# Patient Record
Sex: FEMALE | Race: WHITE | Employment: FULL TIME | URBAN - METROPOLITAN AREA
[De-identification: names, ages, dates, MRNs, and addresses within clinical notes are randomized per-mention and may not be internally consistent; named-entity substitution may affect disease eponyms.]

---

## 2018-02-27 ENCOUNTER — HOSPITAL ENCOUNTER (OUTPATIENT)
Dept: MAMMOGRAPHY | Age: 43
Discharge: HOME | End: 2018-02-27
Attending: INTERNAL MEDICINE | Admitting: INTERNAL MEDICINE

## 2018-02-27 DIAGNOSIS — Z12.31 VISIT FOR SCREENING MAMMOGRAM: ICD-10-CM

## 2018-06-21 ENCOUNTER — TELEPHONE (OUTPATIENT)
Dept: INTERNAL MEDICINE CLINIC | Age: 43
End: 2018-06-21

## 2018-06-26 ENCOUNTER — OFFICE VISIT (OUTPATIENT)
Dept: INTERNAL MEDICINE CLINIC | Age: 43
End: 2018-06-26

## 2018-06-26 VITALS
BODY MASS INDEX: 46.01 KG/M2 | HEART RATE: 64 BPM | SYSTOLIC BLOOD PRESSURE: 124 MMHG | RESPIRATION RATE: 12 BRPM | HEIGHT: 64 IN | WEIGHT: 269.5 LBS | DIASTOLIC BLOOD PRESSURE: 80 MMHG

## 2018-06-26 DIAGNOSIS — R20.2 RIGHT HAND PARESTHESIA: ICD-10-CM

## 2018-06-26 DIAGNOSIS — F41.9 ANXIETY: ICD-10-CM

## 2018-06-26 DIAGNOSIS — H53.483 TUNNEL VISUAL FIELD CONSTRICTION OF BOTH EYES: ICD-10-CM

## 2018-06-26 DIAGNOSIS — I10 ESSENTIAL HYPERTENSION: ICD-10-CM

## 2018-06-26 DIAGNOSIS — H81.12 BENIGN PAROXYSMAL POSITIONAL VERTIGO OF LEFT EAR: ICD-10-CM

## 2018-06-26 PROCEDURE — 99203 OFFICE O/P NEW LOW 30 MIN: CPT | Performed by: INTERNAL MEDICINE

## 2018-06-26 RX ORDER — HYDROCHLOROTHIAZIDE 25 MG/1
25 TABLET ORAL DAILY
COMMUNITY
End: 2019-02-05 | Stop reason: SDUPTHER

## 2018-06-26 RX ORDER — CITALOPRAM 20 MG/1
20 TABLET ORAL DAILY
COMMUNITY
End: 2018-06-26 | Stop reason: SDUPTHER

## 2018-06-26 RX ORDER — PROPRANOLOL HYDROCHLORIDE 80 MG/1
80 CAPSULE, EXTENDED RELEASE ORAL DAILY
Qty: 30 CAPSULE | Refills: 3 | Status: SHIPPED | OUTPATIENT
Start: 2018-06-26 | End: 2018-09-30 | Stop reason: SDUPTHER

## 2018-06-26 RX ORDER — MECLIZINE HYDROCHLORIDE CHEWABLE TABLETS 25 MG/1
25 TABLET, CHEWABLE ORAL 3 TIMES DAILY PRN
Qty: 30 TABLET | Refills: 0 | Status: SHIPPED | OUTPATIENT
Start: 2018-06-26 | End: 2020-09-18

## 2018-06-26 RX ORDER — CITALOPRAM 20 MG/1
20 TABLET ORAL DAILY
Qty: 30 TABLET | Refills: 3 | Status: SHIPPED | OUTPATIENT
Start: 2018-06-26 | End: 2018-08-07

## 2018-06-26 RX ORDER — METHYLDOPA 500 MG/1
500 TABLET, FILM COATED ORAL 2 TIMES DAILY
COMMUNITY
End: 2018-06-26 | Stop reason: ALTCHOICE

## 2018-06-26 RX ORDER — FLUTICASONE PROPIONATE 50 MCG
1 SPRAY, SUSPENSION (ML) NASAL DAILY
COMMUNITY

## 2018-06-26 ASSESSMENT — ENCOUNTER SYMPTOMS
CONSTIPATION: 0
SHORTNESS OF BREATH: 0
CHEST TIGHTNESS: 0
DIARRHEA: 0

## 2018-06-26 ASSESSMENT — PATIENT HEALTH QUESTIONNAIRE - PHQ9
1. LITTLE INTEREST OR PLEASURE IN DOING THINGS: 0
2. FEELING DOWN, DEPRESSED OR HOPELESS: 1
SUM OF ALL RESPONSES TO PHQ QUESTIONS 1-9: 1
SUM OF ALL RESPONSES TO PHQ9 QUESTIONS 1 & 2: 1

## 2018-07-02 ENCOUNTER — HOSPITAL ENCOUNTER (OUTPATIENT)
Dept: CT IMAGING | Age: 43
Discharge: HOME | End: 2018-07-02
Attending: INTERNAL MEDICINE | Admitting: INTERNAL MEDICINE

## 2018-07-02 DIAGNOSIS — H53.483 TUNNEL VISUAL FIELD CONSTRICTION OF BOTH EYES: ICD-10-CM

## 2018-07-02 DIAGNOSIS — R20.2 RIGHT HAND PARESTHESIA: ICD-10-CM

## 2018-07-02 DIAGNOSIS — R20.2 PARESTHESIA OF SKIN: ICD-10-CM

## 2018-08-07 ENCOUNTER — OFFICE VISIT (OUTPATIENT)
Dept: INTERNAL MEDICINE CLINIC | Age: 43
End: 2018-08-07

## 2018-08-07 VITALS
SYSTOLIC BLOOD PRESSURE: 122 MMHG | HEART RATE: 64 BPM | DIASTOLIC BLOOD PRESSURE: 78 MMHG | BODY MASS INDEX: 47.43 KG/M2 | WEIGHT: 272 LBS | RESPIRATION RATE: 12 BRPM

## 2018-08-07 DIAGNOSIS — I10 ESSENTIAL HYPERTENSION: Primary | ICD-10-CM

## 2018-08-07 DIAGNOSIS — R20.2 RIGHT HAND PARESTHESIA: ICD-10-CM

## 2018-08-07 DIAGNOSIS — F41.9 ANXIETY: ICD-10-CM

## 2018-08-07 PROCEDURE — 99213 OFFICE O/P EST LOW 20 MIN: CPT | Performed by: INTERNAL MEDICINE

## 2018-08-07 NOTE — PROGRESS NOTES
Patient: Muna Jurado is a 39 y.o. female who presents today with the following Chief Complaint(s):  Chief Complaint   Patient presents with    Follow-up     f/u on medication       Here for follow up on Anxiety, HTN, and finger tingling. Stopped citalopram once she found out that everything was ok with her fingers as her anxiety then calmed down. No more tingling with her fingers unless she uses them a lot. Is wearing her nocturnal wrist splints and that is helping. 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 Did feel tired initially with propranolol but is doing better now.              No Known Allergies   Past Medical History:   Diagnosis Date    Anxiety     HTN (hypertension)     Vertigo       Past Surgical History:   Procedure Laterality Date     SECTION  ,      Social History     Social History    Marital status:      Spouse name: N/A    Number of children: N/A    Years of education: N/A     Occupational History    teacher [address]     Social History Main Topics    Smoking status: Never Smoker    Smokeless tobacco: Never Used    Alcohol use Yes      Comment: rarely    Drug use: No    Sexual activity: Not on file     Other Topics Concern    Not on file     Social History Narrative    No narrative on file     Family History   Problem Relation Age of Onset    Psoriasis Mother     Arthritis Mother         psoriatic    Hypertension Father         Outpatient Medications Prior to Visit   Medication Sig Dispense Refill    hydrochlorothiazide (HYDRODIURIL) 25 MG tablet Take 25 mg by mouth daily      Cetirizine HCl (ZYRTEC ALLERGY PO) Take by mouth      fluticasone (FLONASE) 50 MCG/ACT nasal spray 1 spray by Nasal route daily      meclizine (ANTIVERT) 25 MG CHEW Take 1 tablet by mouth 3 times daily as needed (vertigo) 30 tablet 0    propranolol (INDERAL LA) 80 MG extended release capsule Take 1 capsule by mouth daily 30 capsule 3  citalopram (CELEXA) 20 MG tablet Take 1 tablet by mouth daily 30 tablet 3     No facility-administered medications prior to visit. Patient's past medical history, surgical history, family history, medications,  and allergies  were all reviewed and updated as appropriate today. Review of Systems   Constitutional: Negative for appetite change, fatigue and fever. Respiratory: Negative for chest tightness and shortness of breath. Cardiovascular: Negative for chest pain. Gastrointestinal: Negative for constipation and diarrhea. Skin: Negative for rash. /78 (Site: Left Arm)   Pulse 64   Resp 12   Wt 272 lb (123.4 kg)   LMP 07/31/2018 (Approximate)   Breastfeeding? No   BMI 47.43 kg/m²   Physical Exam   Constitutional: She appears well-developed and well-nourished. She is cooperative. Non-toxic appearance. HENT:   Head: Normocephalic. Right Ear: Tympanic membrane, external ear and ear canal normal.   Left Ear: Tympanic membrane, external ear and ear canal normal.   Nose: Nose normal.   Mouth/Throat: Oropharynx is clear and moist and mucous membranes are normal.   Neck: Carotid bruit is not present. No thyroid mass and no thyromegaly present. Cardiovascular: Normal rate, regular rhythm, normal heart sounds and intact distal pulses. No murmur heard. Pulses:       Dorsalis pedis pulses are 2+ on the right side, and 2+ on the left side. No LE edema   Pulmonary/Chest: Effort normal and breath sounds normal.   Musculoskeletal:        Right wrist: Normal. She exhibits normal range of motion, no tenderness, no swelling and no effusion. Left wrist: Normal. She exhibits normal range of motion, no tenderness and no swelling. Negative Tinnel's and negative Phalen's   Lymphadenopathy:     She has no cervical adenopathy. Neurological: She is alert.        ASSESSMENT/PLAN:    Problem List Items Addressed This Visit     Essential hypertension - Primary     Well controlled

## 2018-08-12 ASSESSMENT — ENCOUNTER SYMPTOMS
CHEST TIGHTNESS: 0
SHORTNESS OF BREATH: 0
DIARRHEA: 0
CONSTIPATION: 0

## 2018-08-12 NOTE — ASSESSMENT & PLAN NOTE
Resolved with negative work up for paresthesias. No longer taking citalopram. Will let me know if she needs to go back on it.

## 2018-09-30 DIAGNOSIS — H53.483 TUNNEL VISUAL FIELD CONSTRICTION OF BOTH EYES: ICD-10-CM

## 2018-09-30 DIAGNOSIS — I10 ESSENTIAL HYPERTENSION: ICD-10-CM

## 2018-10-01 RX ORDER — PROPRANOLOL HYDROCHLORIDE 80 MG/1
CAPSULE, EXTENDED RELEASE ORAL
Qty: 5 CAPSULE | Refills: 2 | Status: SHIPPED | OUTPATIENT
Start: 2018-10-01 | End: 2018-10-05 | Stop reason: SDUPTHER

## 2018-10-05 ENCOUNTER — TELEPHONE (OUTPATIENT)
Dept: INTERNAL MEDICINE CLINIC | Age: 43
End: 2018-10-05

## 2018-10-05 DIAGNOSIS — I10 ESSENTIAL HYPERTENSION: ICD-10-CM

## 2018-10-05 DIAGNOSIS — H53.483 TUNNEL VISUAL FIELD CONSTRICTION OF BOTH EYES: ICD-10-CM

## 2018-10-05 RX ORDER — PROPRANOLOL HYDROCHLORIDE 80 MG/1
80 CAPSULE, EXTENDED RELEASE ORAL DAILY
Qty: 90 CAPSULE | Refills: 3 | Status: SHIPPED | OUTPATIENT
Start: 2018-10-05 | End: 2018-10-05 | Stop reason: SDUPTHER

## 2018-10-05 RX ORDER — PROPRANOLOL HYDROCHLORIDE 80 MG/1
80 CAPSULE, EXTENDED RELEASE ORAL DAILY
Qty: 90 CAPSULE | Refills: 3 | Status: SHIPPED | OUTPATIENT
Start: 2018-10-05 | End: 2019-09-30

## 2018-10-05 NOTE — TELEPHONE ENCOUNTER
Pt call and states that [place] or [facility] pharmacy cannot fill her RX propranolol (INDERAL LA) 80 MG extended release capsule. She states her insurance required to [facility]. She [facility]  [facility]. She request call back if any question.

## 2018-10-05 NOTE — TELEPHONE ENCOUNTER
Pt calling needs refill of Propranolol ---insurance only pays for 90 day fills---please send to [facility]. Thanks.

## 2019-02-05 ENCOUNTER — OFFICE VISIT (OUTPATIENT)
Dept: INTERNAL MEDICINE CLINIC | Age: 44
End: 2019-02-05
Payer: COMMERCIAL

## 2019-02-05 VITALS
HEART RATE: 78 BPM | WEIGHT: 292.6 LBS | HEIGHT: 64 IN | DIASTOLIC BLOOD PRESSURE: 78 MMHG | BODY MASS INDEX: 49.95 KG/M2 | SYSTOLIC BLOOD PRESSURE: 118 MMHG

## 2019-02-05 DIAGNOSIS — I10 ESSENTIAL HYPERTENSION: Primary | ICD-10-CM

## 2019-02-05 DIAGNOSIS — H53.483 TUNNEL VISUAL FIELD CONSTRICTION OF BOTH EYES: ICD-10-CM

## 2019-02-05 DIAGNOSIS — F43.9 STRESS: ICD-10-CM

## 2019-02-05 PROCEDURE — 99214 OFFICE O/P EST MOD 30 MIN: CPT | Performed by: INTERNAL MEDICINE

## 2019-02-05 RX ORDER — HYDROCHLOROTHIAZIDE 25 MG/1
25 TABLET ORAL DAILY
Qty: 90 TABLET | Refills: 1 | Status: SHIPPED | OUTPATIENT
Start: 2019-02-05 | End: 2019-08-25 | Stop reason: SDUPTHER

## 2019-02-05 RX ORDER — BUPROPION HYDROCHLORIDE 150 MG/1
150 TABLET ORAL EVERY MORNING
Qty: 7 TABLET | Refills: 0 | Status: SHIPPED | OUTPATIENT
Start: 2019-02-05 | End: 2019-05-21 | Stop reason: ALTCHOICE

## 2019-02-05 RX ORDER — BUPROPION HYDROCHLORIDE 300 MG/1
300 TABLET ORAL EVERY MORNING
Qty: 30 TABLET | Refills: 3 | Status: SHIPPED | OUTPATIENT
Start: 2019-02-05 | End: 2019-05-21 | Stop reason: SDUPTHER

## 2019-02-05 ASSESSMENT — ENCOUNTER SYMPTOMS
CHEST TIGHTNESS: 0
SHORTNESS OF BREATH: 0
DIARRHEA: 0
CONSTIPATION: 0

## 2019-05-21 DIAGNOSIS — F43.9 STRESS: ICD-10-CM

## 2019-05-21 RX ORDER — BUPROPION HYDROCHLORIDE 300 MG/1
300 TABLET ORAL EVERY MORNING
Qty: 30 TABLET | Refills: 5 | Status: SHIPPED | OUTPATIENT
Start: 2019-05-21 | End: 2019-12-23 | Stop reason: ALTCHOICE

## 2019-07-17 ENCOUNTER — TELEPHONE (OUTPATIENT)
Dept: INTERNAL MEDICINE CLINIC | Age: 44
End: 2019-07-17

## 2019-07-17 NOTE — TELEPHONE ENCOUNTER
Patient returned call to reschedule appt. Cancelled appt for 8/5. Dr. Laury Glover only has same day slots available. Patient needs to try and get in before going back to school on 8/19.  Please contact patient

## 2019-08-15 ENCOUNTER — HOSPITAL ENCOUNTER (OUTPATIENT)
Dept: WOMENS IMAGING | Age: 44
Discharge: HOME | End: 2019-08-15
Payer: COMMERCIAL

## 2019-08-15 DIAGNOSIS — Z12.39 BREAST CANCER SCREENING: ICD-10-CM

## 2019-08-15 PROCEDURE — 77063 BREAST TOMOSYNTHESIS BI: CPT

## 2019-08-25 DIAGNOSIS — I10 ESSENTIAL HYPERTENSION: ICD-10-CM

## 2019-08-26 RX ORDER — HYDROCHLOROTHIAZIDE 25 MG/1
TABLET ORAL
Qty: 90 TABLET | Refills: 4 | Status: SHIPPED | OUTPATIENT
Start: 2019-08-26 | End: 2020-11-16

## 2019-09-29 DIAGNOSIS — H53.483 TUNNEL VISUAL FIELD CONSTRICTION OF BOTH EYES: ICD-10-CM

## 2019-09-29 DIAGNOSIS — I10 ESSENTIAL HYPERTENSION: ICD-10-CM

## 2019-09-30 RX ORDER — PROPRANOLOL HYDROCHLORIDE 80 MG/1
CAPSULE, EXTENDED RELEASE ORAL
Qty: 90 CAPSULE | Refills: 4 | Status: SHIPPED | OUTPATIENT
Start: 2019-09-30 | End: 2019-12-23 | Stop reason: ALTCHOICE

## 2019-12-23 ENCOUNTER — OFFICE VISIT (OUTPATIENT)
Dept: INTERNAL MEDICINE CLINIC | Age: 44
End: 2019-12-23
Payer: COMMERCIAL

## 2019-12-23 VITALS
OXYGEN SATURATION: 98 % | DIASTOLIC BLOOD PRESSURE: 80 MMHG | WEIGHT: 293 LBS | HEART RATE: 76 BPM | BODY MASS INDEX: 55.8 KG/M2 | SYSTOLIC BLOOD PRESSURE: 120 MMHG

## 2019-12-23 DIAGNOSIS — H53.483 TUNNEL VISUAL FIELD CONSTRICTION OF BOTH EYES: ICD-10-CM

## 2019-12-23 DIAGNOSIS — I10 ESSENTIAL HYPERTENSION: ICD-10-CM

## 2019-12-23 DIAGNOSIS — R53.83 OTHER FATIGUE: ICD-10-CM

## 2019-12-23 DIAGNOSIS — F41.9 ANXIETY: Primary | ICD-10-CM

## 2019-12-23 DIAGNOSIS — Z71.85 VACCINE COUNSELING: ICD-10-CM

## 2019-12-23 DIAGNOSIS — H00.011 HORDEOLUM EXTERNUM OF RIGHT UPPER EYELID: ICD-10-CM

## 2019-12-23 DIAGNOSIS — Z00.00 WELL ADULT EXAM: ICD-10-CM

## 2019-12-23 PROCEDURE — 99214 OFFICE O/P EST MOD 30 MIN: CPT | Performed by: INTERNAL MEDICINE

## 2019-12-23 RX ORDER — CITALOPRAM 10 MG/1
10 TABLET ORAL DAILY
Qty: 90 TABLET | Refills: 1 | Status: SHIPPED | OUTPATIENT
Start: 2019-12-23 | End: 2020-08-17 | Stop reason: SDUPTHER

## 2019-12-23 RX ORDER — BISOPROLOL FUMARATE 5 MG/1
5 TABLET ORAL DAILY
Qty: 30 TABLET | Refills: 3 | Status: SHIPPED | OUTPATIENT
Start: 2019-12-23 | End: 2020-09-18

## 2019-12-23 ASSESSMENT — PATIENT HEALTH QUESTIONNAIRE - PHQ9
SUM OF ALL RESPONSES TO PHQ QUESTIONS 1-9: 0
SUM OF ALL RESPONSES TO PHQ QUESTIONS 1-9: 0
SUM OF ALL RESPONSES TO PHQ9 QUESTIONS 1 & 2: 0
1. LITTLE INTEREST OR PLEASURE IN DOING THINGS: 0
2. FEELING DOWN, DEPRESSED OR HOPELESS: 0

## 2019-12-23 ASSESSMENT — ENCOUNTER SYMPTOMS
CHEST TIGHTNESS: 0
CONSTIPATION: 0
SHORTNESS OF BREATH: 0
DIARRHEA: 0

## 2019-12-26 PROBLEM — R53.83 OTHER FATIGUE: Status: ACTIVE | Noted: 2019-12-26

## 2019-12-26 PROBLEM — Z71.85 VACCINE COUNSELING: Status: ACTIVE | Noted: 2019-12-26

## 2019-12-26 PROBLEM — H00.011 HORDEOLUM EXTERNUM OF RIGHT UPPER EYELID: Status: ACTIVE | Noted: 2019-12-26

## 2020-01-23 DIAGNOSIS — Z00.00 WELL ADULT EXAM: ICD-10-CM

## 2020-01-23 DIAGNOSIS — R53.83 OTHER FATIGUE: ICD-10-CM

## 2020-01-23 LAB
A/G RATIO: 1.9 (ref 1.1–2.2)
ALBUMIN SERPL-MCNC: 4.4 G/DL (ref 3.4–5)
ALP BLD-CCNC: 63 U/L (ref 40–129)
ALT SERPL-CCNC: 11 U/L (ref 10–40)
ANION GAP SERPL CALCULATED.3IONS-SCNC: 15 MMOL/L (ref 3–16)
AST SERPL-CCNC: 15 U/L (ref 15–37)
BASOPHILS ABSOLUTE: 0.1 K/UL (ref 0–0.2)
BASOPHILS RELATIVE PERCENT: 1 %
BILIRUB SERPL-MCNC: 0.8 MG/DL (ref 0–1)
BUN BLDV-MCNC: 12 MG/DL (ref 7–20)
CALCIUM SERPL-MCNC: 9.5 MG/DL (ref 8.3–10.6)
CHLORIDE BLD-SCNC: 99 MMOL/L (ref 99–110)
CHOLESTEROL, TOTAL: 155 MG/DL (ref 0–199)
CO2: 24 MMOL/L (ref 21–32)
CREAT SERPL-MCNC: 0.7 MG/DL (ref 0.6–1.1)
EOSINOPHILS ABSOLUTE: 0 K/UL (ref 0–0.6)
EOSINOPHILS RELATIVE PERCENT: 0.9 %
GFR AFRICAN AMERICAN: >60
GFR NON-AFRICAN AMERICAN: >60
GLOBULIN: 2.3 G/DL
GLUCOSE BLD-MCNC: 87 MG/DL (ref 70–99)
HCT VFR BLD CALC: 37 % (ref 36–48)
HDLC SERPL-MCNC: 67 MG/DL (ref 40–60)
HEMOGLOBIN: 12 G/DL (ref 12–16)
LDL CHOLESTEROL CALCULATED: 75 MG/DL
LYMPHOCYTES ABSOLUTE: 1.3 K/UL (ref 1–5.1)
LYMPHOCYTES RELATIVE PERCENT: 23.7 %
MCH RBC QN AUTO: 25.2 PG (ref 26–34)
MCHC RBC AUTO-ENTMCNC: 32.3 G/DL (ref 31–36)
MCV RBC AUTO: 77.9 FL (ref 80–100)
MONOCYTES ABSOLUTE: 0.3 K/UL (ref 0–1.3)
MONOCYTES RELATIVE PERCENT: 6.3 %
NEUTROPHILS ABSOLUTE: 3.7 K/UL (ref 1.7–7.7)
NEUTROPHILS RELATIVE PERCENT: 68.1 %
PDW BLD-RTO: 16.6 % (ref 12.4–15.4)
PLATELET # BLD: 312 K/UL (ref 135–450)
PMV BLD AUTO: 8.5 FL (ref 5–10.5)
POTASSIUM SERPL-SCNC: 4.2 MMOL/L (ref 3.5–5.1)
RBC # BLD: 4.75 M/UL (ref 4–5.2)
SODIUM BLD-SCNC: 138 MMOL/L (ref 136–145)
TOTAL PROTEIN: 6.7 G/DL (ref 6.4–8.2)
TRIGL SERPL-MCNC: 66 MG/DL (ref 0–150)
TSH REFLEX: 2.5 UIU/ML (ref 0.27–4.2)
VITAMIN D 25-HYDROXY: 18.9 NG/ML
VLDLC SERPL CALC-MCNC: 13 MG/DL
WBC # BLD: 5.5 K/UL (ref 4–11)

## 2020-02-17 ENCOUNTER — OFFICE VISIT (OUTPATIENT)
Dept: INTERNAL MEDICINE CLINIC | Age: 45
End: 2020-02-17
Payer: COMMERCIAL

## 2020-02-17 VITALS
HEART RATE: 72 BPM | WEIGHT: 293 LBS | HEIGHT: 64 IN | BODY MASS INDEX: 50.02 KG/M2 | DIASTOLIC BLOOD PRESSURE: 78 MMHG | OXYGEN SATURATION: 99 % | SYSTOLIC BLOOD PRESSURE: 118 MMHG

## 2020-02-17 PROBLEM — E55.9 VITAMIN D INSUFFICIENCY: Status: ACTIVE | Noted: 2020-02-17

## 2020-02-17 PROBLEM — E66.01 CLASS 3 SEVERE OBESITY WITHOUT SERIOUS COMORBIDITY WITH BODY MASS INDEX (BMI) OF 50.0 TO 59.9 IN ADULT (HCC): Status: ACTIVE | Noted: 2020-02-17

## 2020-02-17 PROBLEM — E61.1 IRON DEFICIENCY: Status: ACTIVE | Noted: 2020-02-17

## 2020-02-17 PROBLEM — E66.813 CLASS 3 SEVERE OBESITY WITHOUT SERIOUS COMORBIDITY WITH BODY MASS INDEX (BMI) OF 50.0 TO 59.9 IN ADULT: Status: ACTIVE | Noted: 2020-02-17

## 2020-02-17 PROCEDURE — 99214 OFFICE O/P EST MOD 30 MIN: CPT | Performed by: INTERNAL MEDICINE

## 2020-02-17 RX ORDER — LISINOPRIL 10 MG/1
10 TABLET ORAL DAILY
Qty: 30 TABLET | Refills: 1 | Status: SHIPPED | OUTPATIENT
Start: 2020-02-17 | End: 2020-04-13

## 2020-02-17 ASSESSMENT — PATIENT HEALTH QUESTIONNAIRE - PHQ9
SUM OF ALL RESPONSES TO PHQ QUESTIONS 1-9: 0
SUM OF ALL RESPONSES TO PHQ9 QUESTIONS 1 & 2: 0
2. FEELING DOWN, DEPRESSED OR HOPELESS: 0
1. LITTLE INTEREST OR PLEASURE IN DOING THINGS: 0
SUM OF ALL RESPONSES TO PHQ QUESTIONS 1-9: 0

## 2020-02-17 ASSESSMENT — ENCOUNTER SYMPTOMS
DIARRHEA: 0
CONSTIPATION: 0
CHEST TIGHTNESS: 0
SHORTNESS OF BREATH: 0

## 2020-02-17 NOTE — PROGRESS NOTES
Patient: Hussain Mascorro is a 38 y.o. female who presents today with the following Chief Complaint(s):  Chief Complaint   Patient presents with    Blood Pressure Check     8 weeks       HPI     Here today for follow up. HTN- BP is well controlled on bisoprolol. Has not really noticed any changes in energy level. Has slightly improved sex drive. No tunnel vision. Does notice a slight HA when she first wakes up in the mornings. Is also worried that she has gained weight d/t beta blockers. Has been following keto diet very closely and has only lost 5 pounds. Is also taking Celexa q 4 days for the past few months. No increase in anxiety. Is worried that this has caused weight gain. Has had low iron in the past- periods were heavy but are getting lighter.      Lab Results   Component Value Date     01/23/2020    K 4.2 01/23/2020    CL 99 01/23/2020    CO2 24 01/23/2020    BUN 12 01/23/2020    CREATININE 0.7 01/23/2020    GLUCOSE 87 01/23/2020    CALCIUM 9.5 01/23/2020    PROT 6.7 01/23/2020    LABALBU 4.4 01/23/2020    BILITOT 0.8 01/23/2020    ALKPHOS 63 01/23/2020    AST 15 01/23/2020    ALT 11 01/23/2020    LABGLOM >60 01/23/2020    GFRAA >60 01/23/2020    AGRATIO 1.9 01/23/2020    GLOB 2.3 01/23/2020       Lab Results   Component Value Date    WBC 5.5 01/23/2020    HGB 12.0 01/23/2020    HCT 37.0 01/23/2020    MCV 77.9 (L) 01/23/2020     01/23/2020     Lab Results   Component Value Date    CHOL 155 01/23/2020     Lab Results   Component Value Date    TRIG 66 01/23/2020     Lab Results   Component Value Date    HDL 67 (H) 01/23/2020     Lab Results   Component Value Date    LDLCALC 75 01/23/2020     Lab Results   Component Value Date    LABVLDL 13 01/23/2020     No results found for: CHOLHDLRATIO    TSH 2.50  0.27 - 4.20 uIU/mL Final 01/23/2020 10:34 AM  - [facility]     Vit D, 25-Hydroxy 18.9Low   >=30 ng/mL Final 01/23/2020 10:34 AM  - [facility]         No Known Allergies She is alert. Psychiatric:         Mood and Affect: Mood normal.         Behavior: Behavior normal. Behavior is cooperative. ASSESSMENT/PLAN:    Problem List Items Addressed This Visit     Anxiety     Doing well weaning Celexa- is only taking every 4th day. Ok to stop Celexa all together. Class 3 severe obesity without serious comorbidity with body mass index (BMI) of 50.0 to 59.9 in [facility])     Is following keto-diet and has not loast as much weight as she would have expected. TSH was normal. Is worried that her weight gain is related to use of BB and citalopram as she started gaining weight when she started taking these medications. Will have her stop citalopram (has been weaning) and change bisoprolol to lisinopril. Essential hypertension - Primary     Discontinue bisoprolol (decrease to 2.5 mg qd x 1 week then stop) and start lisinopril 10 mg qd (5 mg qd x 1 week while weaning bisoprolol). Check BMP in 2 weeks. Relevant Medications    lisinopril (PRINIVIL;ZESTRIL) 10 MG tablet    Other Relevant Orders    BASIC METABOLIC PANEL    Iron deficiency     No anemia. Add OTC iron supplement qd. Tunnel visual field constriction of both eyes     Has resolved with use of BB. Will see if this recurs with discontinuing BB. Vitamin D insufficiency     Add OTC vitamin D3 5,000 units qd.                 Current Outpatient Medications   Medication Sig Dispense Refill    lisinopril (PRINIVIL;ZESTRIL) 10 MG tablet Take 1 tablet by mouth daily 30 tablet 1    citalopram (CELEXA) 10 MG tablet Take 1 tablet by mouth daily 90 tablet 1    bisoprolol (ZEBETA) 5 MG tablet Take 1 tablet by mouth daily 30 tablet 3    hydrochlorothiazide (HYDRODIURIL) 25 MG tablet TAKE 1 TABLET DAILY 90 tablet 4    Cetirizine HCl (ZYRTEC ALLERGY PO) Take by mouth      fluticasone (FLONASE) 50 MCG/ACT nasal spray 1 spray by Nasal route daily      meclizine (ANTIVERT) 25 MG CHEW Take 1 tablet by mouth 3 times daily as needed (vertigo) 30 tablet 0     No current facility-administered medications for this visit. Return in about 6 weeks (around 3/30/2020) for BP.

## 2020-02-17 NOTE — PATIENT INSTRUCTIONS
Please add an iron supplement and vitamin D3 5,000 units daily. Decrease bisoprolol to 1/2 pill daily for 1 week then stop  Add lisinopril 10 mg - take 1/2 pill daily while on bisoprolol then increase to 1 daily. I will want a non-fasting blood test 2 weeks after starting lisinopril to make sure that your body is tolerating it. Rarely, potassium levels increase and kidney function worsens with lisinopril. This is reversible with stopping lisinopril.      Stop citalopram.

## 2020-03-06 DIAGNOSIS — I10 ESSENTIAL HYPERTENSION: ICD-10-CM

## 2020-03-06 LAB
ANION GAP SERPL CALCULATED.3IONS-SCNC: 18 MMOL/L (ref 3–16)
BUN BLDV-MCNC: 17 MG/DL (ref 7–20)
CALCIUM SERPL-MCNC: 9.2 MG/DL (ref 8.3–10.6)
CHLORIDE BLD-SCNC: 99 MMOL/L (ref 99–110)
CO2: 22 MMOL/L (ref 21–32)
CREAT SERPL-MCNC: 0.7 MG/DL (ref 0.6–1.1)
GFR AFRICAN AMERICAN: >60
GFR NON-AFRICAN AMERICAN: >60
GLUCOSE BLD-MCNC: 83 MG/DL (ref 70–99)
POTASSIUM SERPL-SCNC: 3.7 MMOL/L (ref 3.5–5.1)
SODIUM BLD-SCNC: 139 MMOL/L (ref 136–145)

## 2020-03-24 RX ORDER — BUSPIRONE HYDROCHLORIDE 5 MG/1
5 TABLET ORAL 3 TIMES DAILY PRN
Qty: 30 TABLET | Refills: 0 | Status: SHIPPED | OUTPATIENT
Start: 2020-03-24 | End: 2020-08-17 | Stop reason: SDUPTHER

## 2020-03-30 ENCOUNTER — TELEPHONE (OUTPATIENT)
Dept: INTERNAL MEDICINE CLINIC | Age: 45
End: 2020-03-30

## 2020-04-13 RX ORDER — LISINOPRIL 10 MG/1
TABLET ORAL
Qty: 30 TABLET | Refills: 1 | Status: SHIPPED | OUTPATIENT
Start: 2020-04-13 | End: 2020-05-27 | Stop reason: SDUPTHER

## 2020-05-27 ENCOUNTER — TELEPHONE (OUTPATIENT)
Dept: INTERNAL MEDICINE CLINIC | Age: 45
End: 2020-05-27

## 2020-05-27 RX ORDER — LISINOPRIL 10 MG/1
TABLET ORAL
Qty: 90 TABLET | Refills: 3 | Status: SHIPPED | OUTPATIENT
Start: 2020-05-27 | End: 2021-05-19 | Stop reason: SDUPTHER

## 2020-08-17 RX ORDER — BUSPIRONE HYDROCHLORIDE 5 MG/1
5 TABLET ORAL 3 TIMES DAILY PRN
Qty: 90 TABLET | Refills: 3 | Status: SHIPPED | OUTPATIENT
Start: 2020-08-17 | End: 2020-08-21

## 2020-08-17 RX ORDER — CITALOPRAM 10 MG/1
10 TABLET ORAL DAILY
Qty: 90 TABLET | Refills: 1 | Status: SHIPPED | OUTPATIENT
Start: 2020-08-17 | End: 2020-09-04 | Stop reason: SDUPTHER

## 2020-08-19 ENCOUNTER — TELEPHONE (OUTPATIENT)
Dept: INTERNAL MEDICINE CLINIC | Age: 45
End: 2020-08-19

## 2020-08-19 NOTE — TELEPHONE ENCOUNTER
I have not seen any messages in [facility] regarding this. Is she referring to the Celexa or Buspar?

## 2020-08-19 NOTE — TELEPHONE ENCOUNTER
Pt called states she has been messaging thru  [employer] to let  Dr Ger Olivares know the medication prescribed her is making  tingling and numbness in her hands and when she looked it up on Google this is one of the side effects. She took the medication last pm but stopped this morning and she is questioning how long the medication will stay in her body until she starts filling better. This all started after her medication was increased  When speaking with the patient she was crying.

## 2020-08-19 NOTE — TELEPHONE ENCOUNTER
May take 1-2 days for symptoms to resolve. After she is feeling better, would be ok to take 5 mg instead of 10 mg as she did not have side effects with that dose.  saw

## 2020-08-19 NOTE — TELEPHONE ENCOUNTER
Pt states that she was having numbness with 5 mg also. She wants to make sure that the Citalopram is okay for her to take still. She would like to know if she can something as needed added to what she is already taking. 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

## 2020-08-21 ENCOUNTER — OFFICE VISIT (OUTPATIENT)
Dept: INTERNAL MEDICINE CLINIC | Age: 45
End: 2020-08-21
Payer: COMMERCIAL

## 2020-08-21 VITALS
BODY MASS INDEX: 52.42 KG/M2 | TEMPERATURE: 98.2 F | WEIGHT: 293 LBS | HEART RATE: 112 BPM | SYSTOLIC BLOOD PRESSURE: 128 MMHG | DIASTOLIC BLOOD PRESSURE: 80 MMHG | OXYGEN SATURATION: 99 %

## 2020-08-21 PROCEDURE — 99214 OFFICE O/P EST MOD 30 MIN: CPT | Performed by: NURSE PRACTITIONER

## 2020-08-21 RX ORDER — HYDROXYZINE HYDROCHLORIDE 25 MG/1
50 TABLET, FILM COATED ORAL EVERY 8 HOURS PRN
Qty: 30 TABLET | Refills: 0 | Status: SHIPPED | OUTPATIENT
Start: 2020-08-21 | End: 2020-09-18

## 2020-08-21 ASSESSMENT — ENCOUNTER SYMPTOMS
PHOTOPHOBIA: 0
SHORTNESS OF BREATH: 0
CHEST TIGHTNESS: 0
COUGH: 0
WHEEZING: 0
EYE PAIN: 0

## 2020-08-21 NOTE — PROGRESS NOTES
8/21/20     Chief Complaint   Patient presents with    Numbness     right hand/ fingers, face, started buspar thought it was her anxiety, higher dose and now everything has gotten worse     HPI    Patient is here for numbness /tingling in her right hands /fingers which started about 1 week after starting buspar. Has increased anxiety started on buspar - thought it was anxiety. Increased buspar to 10 mg TID - numbness and tingling got worse - spread to bilateral feet, left hand and face. Stopped the medication weds and the buspar with some improvement to numbness and tingling. Now only to right finger tips and cheek. She denies any chest pain, shortness of breath, dizziness, weakness  She reports she is sleeping well at night. Pt is a    Going back Sept 3rd with [employer] [employer]. She is extremely anxious about going back to school. No Known Allergies    Current Outpatient Medications   Medication Sig Dispense Refill    hydrOXYzine (ATARAX) 25 MG tablet Take 2 tablets by mouth every 8 hours as needed for Anxiety 30 tablet 0    citalopram (CELEXA) 10 MG tablet Take 1 tablet by mouth daily 90 tablet 1    lisinopril (PRINIVIL;ZESTRIL) 10 MG tablet TAKE 1 TABLET BY MOUTH EVERY DAY 90 tablet 3    hydrochlorothiazide (HYDRODIURIL) 25 MG tablet TAKE 1 TABLET DAILY 90 tablet 4    Cetirizine HCl (ZYRTEC ALLERGY PO) Take by mouth      bisoprolol (ZEBETA) 5 MG tablet Take 1 tablet by mouth daily (Patient not taking: Reported on 8/21/2020) 30 tablet 3    fluticasone (FLONASE) 50 MCG/ACT nasal spray 1 spray by Nasal route daily      meclizine (ANTIVERT) 25 MG CHEW Take 1 tablet by mouth 3 times daily as needed (vertigo) (Patient not taking: Reported on 8/21/2020) 30 tablet 0     No current facility-administered medications for this visit. Review of Systems   Constitutional: Negative for chills, fatigue and fever. Eyes: Negative for photophobia, pain and visual disturbance.

## 2020-08-25 ENCOUNTER — HOSPITAL ENCOUNTER (OUTPATIENT)
Dept: MAMMOGRAPHY | Age: 45
Discharge: HOME | End: 2020-08-25
Payer: COMMERCIAL

## 2020-08-25 PROCEDURE — 77063 BREAST TOMOSYNTHESIS BI: CPT

## 2020-09-04 ENCOUNTER — TELEPHONE (OUTPATIENT)
Dept: INTERNAL MEDICINE CLINIC | Age: 45
End: 2020-09-04

## 2020-09-04 RX ORDER — CITALOPRAM 20 MG/1
20 TABLET ORAL DAILY
Qty: 30 TABLET | Refills: 5 | Status: SHIPPED | OUTPATIENT
Start: 2020-09-04 | End: 2020-09-18 | Stop reason: SDUPTHER

## 2020-09-04 NOTE — TELEPHONE ENCOUNTER
Pt calling needs new script for the Citalopram---she is now taking 20 mg instead of 10---Anita Tamez had upped her dose but she now needs a new script---she only has enough for today and tomorrow--please send to [facility] [place]  Thanks.

## 2020-09-18 ENCOUNTER — OFFICE VISIT (OUTPATIENT)
Dept: INTERNAL MEDICINE CLINIC | Age: 45
End: 2020-09-18
Payer: COMMERCIAL

## 2020-09-18 VITALS
SYSTOLIC BLOOD PRESSURE: 120 MMHG | HEART RATE: 84 BPM | OXYGEN SATURATION: 99 % | WEIGHT: 293 LBS | BODY MASS INDEX: 52.59 KG/M2 | TEMPERATURE: 98.3 F | DIASTOLIC BLOOD PRESSURE: 78 MMHG

## 2020-09-18 PROCEDURE — 99213 OFFICE O/P EST LOW 20 MIN: CPT | Performed by: NURSE PRACTITIONER

## 2020-09-18 RX ORDER — CITALOPRAM 20 MG/1
20 TABLET ORAL DAILY
Qty: 90 TABLET | Refills: 1 | Status: SHIPPED | OUTPATIENT
Start: 2020-09-18 | End: 2021-03-29

## 2020-09-18 ASSESSMENT — ENCOUNTER SYMPTOMS
CHEST TIGHTNESS: 0
WHEEZING: 0
SHORTNESS OF BREATH: 0
COUGH: 0

## 2020-09-18 NOTE — PROGRESS NOTES
2020    This is a 54 y.o. female   Chief Complaint   Patient presents with    1 Month Follow-Up     anxiety from reaction to medication, is feeling normal again     HPI     - @ Jocelynn Santiago in the [place]. Patient here for four week follow up after increasing Celexa. Overall feeling well, mood has been doing well. She feels \"wonderful\". No anxious thoughts or feelings, no SI/HI. BP well controlled on HCTZ and lisinopril (120/78 today), taking medications as prescribed with no side effects.      Past Medical History:   Diagnosis Date    Anxiety     HTN (hypertension)     Vertigo      Past Surgical History:   Procedure Laterality Date     SECTION  ,     Social History     Socioeconomic History    Marital status:      Spouse name: Not on file    Number of children: Not on file    Years of education: Not on file    Highest education level: Not on file   Occupational History    Occupation: teacher     Employer: [employer]   Social Needs    Financial resource strain: Not on file    Food insecurity     Worry: Not on file     Inability: Not on file    Transportation needs     Medical: Not on file     Non-medical: Not on file   Tobacco Use    Smoking status: Never Smoker    Smokeless tobacco: Never Used   Substance and Sexual Activity    Alcohol use: Yes     Comment: rarely    Drug use: No    Sexual activity: Not on file   Lifestyle    Physical activity     Days per week: Not on file     Minutes per session: Not on file    Stress: Not on file   Relationships    Social connections     Talks on phone: Not on file     Gets together: Not on file     Attends [employer] service: Not on file     Active member of club or organization: Not on file     Attends meetings of clubs or organizations: Not on file     Relationship status: Not on file    Intimate partner violence     Fear of current or ex partner: Not on file     Emotionally abused: Not on file     Physically abused: Not on file     Forced sexual activity: Not on file   Other Topics Concern    Not on file   Social History Narrative    Not on file       Family History   Problem Relation Age of Onset    Psoriasis Mother     Arthritis Mother         psoriatic    Hypertension Father        Current Outpatient Medications   Medication Sig Dispense Refill    citalopram (CELEXA) 20 MG tablet Take 1 tablet by mouth daily 90 tablet 1    lisinopril (PRINIVIL;ZESTRIL) 10 MG tablet TAKE 1 TABLET BY MOUTH EVERY DAY 90 tablet 3    hydrochlorothiazide (HYDRODIURIL) 25 MG tablet TAKE 1 TABLET DAILY 90 tablet 4    Cetirizine HCl (ZYRTEC ALLERGY PO) Take by mouth      fluticasone (FLONASE) 50 MCG/ACT nasal spray 1 spray by Nasal route daily       No current facility-administered medications for this visit. Allergies   Allergen Reactions    Buspar [Buspirone] Other (See Comments)     Numbness and tingling        Orders Only on 03/06/2020   Component Date Value Ref Range Status    Sodium 03/06/2020 139  136 - 145 mmol/L Final    Potassium 03/06/2020 3.7  3.5 - 5.1 mmol/L Final    Chloride 03/06/2020 99  99 - 110 mmol/L Final    CO2 03/06/2020 22  21 - 32 mmol/L Final    Anion Gap 03/06/2020 18* 3 - 16 Final    Glucose 03/06/2020 83  70 - 99 mg/dL Final    BUN 03/06/2020 17  7 - 20 mg/dL Final    CREATININE 03/06/2020 0.7  0.6 - 1.1 mg/dL Final    GFR Non- 03/06/2020 >60  >60 Final    Comment: >60 mL/min/1.73m2 EGFR, calc. for ages 25 and older using the  MDRD formula (not corrected for weight), is valid for stable  renal function.  GFR  03/06/2020 >60  >60 Final    Comment: Chronic Kidney Disease: less than 60 ml/min/1.73 sq.m. Kidney Failure: less than 15 ml/min/1.73 sq.m. Results valid for patients 18 years and older.       Calcium 03/06/2020 9.2  8.3 - 10.6 mg/dL Final       Review of Systems   Constitutional: Negative for chills, fatigue and fever.   Respiratory: Negative for cough, chest tightness, shortness of breath and wheezing. Cardiovascular: Negative for chest pain, palpitations and leg swelling. Neurological: Negative for dizziness, tremors, light-headedness and headaches. /78   Pulse 84   Temp 98.3 °F (36.8 °C)   Wt (!) 306 lb 6.4 oz (139 kg)   SpO2 99%   BMI 52.59 kg/m²   Physical Exam  Vitals signs reviewed. Constitutional:       General: She is not in acute distress. Appearance: She is well-developed. She is not ill-appearing or diaphoretic. HENT:      Head: Normocephalic and atraumatic. Cardiovascular:      Rate and Rhythm: Normal rate and regular rhythm. Heart sounds: Normal heart sounds. No murmur. Pulmonary:      Effort: Pulmonary effort is normal. No respiratory distress. Breath sounds: Normal breath sounds. No wheezing or rhonchi. Abdominal:      General: Bowel sounds are normal. There is no distension. Palpations: Abdomen is soft. There is no mass. Tenderness: There is no abdominal tenderness. Skin:     General: Skin is warm and dry. Neurological:      General: No focal deficit present. Mental Status: She is alert and oriented to person, place, and time. Psychiatric:         Mood and Affect: Mood and affect normal.         Behavior: Behavior normal.         Thought Content: Thought content normal.       Diagnosis  Assessment and Plan  1. Anxiety  Stable, controlled on current regimen. 2. Essential hypertension  Stable, controlled on current regimen. Patient to follow up in 6 months or sooner if symptoms persist or worsen.     Electronically signed by JARED Kessler CNP on 9/18/2020 at 4:22 PM

## 2020-11-16 RX ORDER — HYDROCHLOROTHIAZIDE 25 MG/1
TABLET ORAL
Qty: 90 TABLET | Refills: 3 | Status: SHIPPED | OUTPATIENT
Start: 2020-11-16 | End: 2021-11-22

## 2020-12-16 RX ORDER — AMOXICILLIN AND CLAVULANATE POTASSIUM 875; 125 MG/1; MG/1
1 TABLET, FILM COATED ORAL 2 TIMES DAILY
Qty: 20 TABLET | Refills: 0 | Status: SHIPPED | OUTPATIENT
Start: 2020-12-16 | End: 2020-12-26

## 2020-12-29 ENCOUNTER — PATIENT MESSAGE (OUTPATIENT)
Dept: INTERNAL MEDICINE CLINIC | Age: 45
End: 2020-12-29

## 2020-12-30 RX ORDER — METHYLPREDNISOLONE 4 MG/1
TABLET ORAL
Qty: 21 TABLET | Refills: 0 | Status: SHIPPED | OUTPATIENT
Start: 2020-12-30 | End: 2021-01-08

## 2020-12-30 NOTE — TELEPHONE ENCOUNTER
From: Anya Miller  To: Gus Owens DO  Sent: 12/29/2020 7:43 PM EST  Subject: Non-Urgent Moe Reza,  I hope you had a great Oli. The antibiotics cleared up my sinus infection, so thank you for calling that in for me. Now, my sciatic nerve is irritated and it is shooting down my leg. It will flare up once in a while, but this time it is hard to just walk. Ive tried icing and heat, stretches and Advil. None of it is helping. Is there anything you can give me to help. Maybe muscle relaxer or prednisone? I am falling apart this year. Thanks so much!   Alex Heredia

## 2021-01-08 ENCOUNTER — OFFICE VISIT (OUTPATIENT)
Dept: INTERNAL MEDICINE CLINIC | Age: 46
End: 2021-01-08
Payer: COMMERCIAL

## 2021-01-08 VITALS
SYSTOLIC BLOOD PRESSURE: 124 MMHG | OXYGEN SATURATION: 99 % | BODY MASS INDEX: 51.49 KG/M2 | DIASTOLIC BLOOD PRESSURE: 80 MMHG | TEMPERATURE: 96.7 F | HEART RATE: 98 BPM | WEIGHT: 293 LBS

## 2021-01-08 DIAGNOSIS — M25.571 ACUTE RIGHT ANKLE PAIN: Primary | ICD-10-CM

## 2021-01-08 PROCEDURE — 99213 OFFICE O/P EST LOW 20 MIN: CPT | Performed by: NURSE PRACTITIONER

## 2021-01-08 RX ORDER — NAPROXEN 500 MG/1
500 TABLET ORAL 2 TIMES DAILY PRN
Qty: 60 TABLET | Refills: 0 | Status: SHIPPED | OUTPATIENT
Start: 2021-01-08 | End: 2021-11-01

## 2021-01-08 ASSESSMENT — PATIENT HEALTH QUESTIONNAIRE - PHQ9
2. FEELING DOWN, DEPRESSED OR HOPELESS: 0
SUM OF ALL RESPONSES TO PHQ QUESTIONS 1-9: 0

## 2021-01-08 ASSESSMENT — ENCOUNTER SYMPTOMS
NAUSEA: 0
COUGH: 0
CONSTIPATION: 0
WHEEZING: 0
DIARRHEA: 0
VOMITING: 0
ABDOMINAL PAIN: 0
SHORTNESS OF BREATH: 0

## 2021-01-08 NOTE — PROGRESS NOTES
Acute Office Visit  1/8/2021    SUBJECTIVE:    Patient ID: Cristy Salazar is a 54 y.o. female. Chief Complaint   Patient presents with    Ankle Pain     right foot and ankle     HPI: The patient presents to the office for an acute visit. Pt states that 1.5 weeks ago, she was started on steroids d/t right sided sciatica from a provider in the office. She states that this helped her sciatic pain. Last week, she started with right sided toe/ankle pains. She denies injury/trauma. Toe pains resolved. Her  states that she was walking on her toes d/t sciatic pains- this is when the pain started. Right lateral ankle swollen. Using crutches to help decrease pain. Foot pain improving from when this started- improving daily per pt. Using advil OTC and epsum salt baths with some relief. Denies chest pain, palpitations, shortness of breath, trouble breathing, lightheadedness, dizziness or blurred vision. Allergies   Allergen Reactions    Buspar [Buspirone] Other (See Comments)     Numbness and tingling      Current Outpatient Medications   Medication Sig Dispense Refill    naproxen (NAPROSYN) 500 MG tablet Take 1 tablet by mouth 2 times daily as needed for Pain 60 tablet 0    hydroCHLOROthiazide (HYDRODIURIL) 25 MG tablet TAKE 1 TABLET DAILY 90 tablet 3    citalopram (CELEXA) 20 MG tablet Take 1 tablet by mouth daily 90 tablet 1    lisinopril (PRINIVIL;ZESTRIL) 10 MG tablet TAKE 1 TABLET BY MOUTH EVERY DAY 90 tablet 3    Cetirizine HCl (ZYRTEC ALLERGY PO) Take by mouth      fluticasone (FLONASE) 50 MCG/ACT nasal spray 1 spray by Nasal route daily       No current facility-administered medications for this visit. Review of Systems   Constitutional: Negative for chills, fatigue, fever and unexpected weight change. Eyes: Negative for visual disturbance. Respiratory: Negative for cough, shortness of breath and wheezing. Cardiovascular: Negative for chest pain, palpitations and leg swelling. Gastrointestinal: Negative for abdominal pain, constipation, diarrhea, nausea and vomiting. Musculoskeletal:        Right ankle pain   Skin: Negative for pallor and rash. Neurological: Negative for dizziness, weakness, light-headedness, numbness and headaches. OBJECTIVE:  /80   Pulse 98   Temp 96.7 °F (35.9 °C)   Wt 300 lb (136.1 kg)   SpO2 99%   BMI 51.49 kg/m²    Physical Exam  Vitals signs reviewed. Constitutional:       General: She is not in acute distress. Appearance: She is well-developed. She is not diaphoretic. HENT:      Head: Normocephalic and atraumatic. Eyes:      Pupils: Pupils are equal, round, and reactive to light. Cardiovascular:      Rate and Rhythm: Normal rate and regular rhythm. Pulmonary:      Effort: Pulmonary effort is normal.      Breath sounds: Normal breath sounds. Musculoskeletal:      Comments: +1 non-pitting edema noted to the right lateral ankle   Skin:     General: Skin is warm and dry. Neurological:      Mental Status: She is alert and oriented to person, place, and time. Coordination: Coordination normal.   Psychiatric:         Mood and Affect: Mood normal.       ASSESSMENT/PLAN:  Cora was seen today for ankle pain. Diagnoses and all orders for this visit:    Acute right ankle pain  -    Pain started after the patient was treated for sciatica and she was walking on the tips of her toes-this is when right lateral ankle pain and swelling started. - ROM normal. No redness/heat or tenderness to palpation.  - Symptomatic management reviewed. Recommended heating pads and elevation of lower extremities with anti-inflammatories. Patient is agreeable to the plan. - naproxen (NAPROSYN) 500 MG tablet; Take 1 tablet by mouth 2 times daily as needed for Pain - patient education handout provided and reviewed with the pt. - Patient will call symptoms worsen or fail to improve  - Red flag warning signs reviewed with patient she will go to the ER if these occur    Return for as previously scheduled or sooner if needed. Pt informed to call if symptoms worsen or fail to improve. All questions answered. Patient states no further questions or concerns at this time.     Electronically signed by JARED Navarro CNP 01/08/21

## 2021-01-08 NOTE — PATIENT INSTRUCTIONS
Heating pad or ice twice daily  Naproxen twice daily for 7 days and then as needed  Elevated legs    Patient Education   naproxen  Pronunciation:  na PROX en  Brand:  Aleve, Anaprox-DS, Midol Extended Relief, Naprelan 500, Naprosyn  What is the most important information I should know about naproxen? Naproxen can increase your risk of fatal heart attack or stroke, even if you don't have any risk factors. Do not use this medicine just before or after heart bypass surgery (coronary artery bypass graft, or CABG). Naproxen may also cause stomach or intestinal bleeding, which can be fatal. These conditions can occur without warning while you are using naproxen, especially in older adults. What is naproxen? Naproxen is a nonsteroidal anti-inflammatory drug (NSAID). Naproxen is used to treat pain or inflammation caused by conditions such as arthritis, ankylosing spondylitis, tendinitis, bursitis, gout, or menstrual cramps. The delayed-release or extended-release tablets are slower-acting forms of naproxen that are used only for treating chronic conditions such as arthritis or ankylosing spondylitis. These forms of naproxen will not work fast enough to treat acute pain. Naproxen may also be used for purposes not listed in this medication guide. What should I discuss with my healthcare provider before taking naproxen? Naproxen can increase your risk of fatal heart attack or stroke, even if you don't have any risk factors. Do not use this medicine just before or after heart bypass surgery (coronary artery bypass graft, or CABG). Naproxen may also cause stomach or intestinal bleeding, which can be fatal. These conditions can occur without warning while you are using naproxen, especially in older adults. You should not use naproxen if you are allergic to it, or if you have ever had an asthma attack or severe allergic reaction after taking aspirin or an NSAID. Ask a doctor before giving naproxen to a child younger than 15years old. Ask a doctor or pharmacist if this medicine is safe to use if you have:  · heart disease, high blood pressure, high cholesterol, diabetes, or if you smoke;  · a heart attack, stroke, or blood clot;  · stomach ulcers or bleeding;  · asthma;  · liver or kidney disease;  · fluid retention; or  · if you take aspirin to prevent heart attack or stroke. Taking naproxen during the last 3 months of pregnancy may harm the unborn baby. Ask a doctor before using this medicine if you are pregnant. Naproxen may interfere with ovulation, causing temporary infertility. You should not breastfeed while using this medicine. How should I take naproxen? Use exactly as directed on the label, or as prescribed by your doctor. Use the lowest dose that is effective in treating your condition. Shake the oral suspension (liquid) before you measure a dose. Use the dosing syringe provided, or use a medicine dose-measuring device (not a kitchen spoon). Always follow directions on the medicine label about giving this medicine to a child. Naproxen doses are based on weight in children. Your child's dose needs may change if the child gains or loses weight. If you use naproxen long-term, you may need frequent medical tests. This medicine can affect the results of certain medical tests. Tell any doctor who treats you that you are using naproxen. Store at room temperature away from moisture, heat, and light. Keep the bottle tightly closed when not in use. What happens if I miss a dose? Since naproxen is used when needed, you may not be on a dosing schedule. Skip any missed dose if it's almost time for your next dose. Do not use two doses at one time. What happens if I overdose? Seek emergency medical attention or call the Poison Help line at 1-412.151.5106. What should I avoid while taking naproxen? Avoid drinking alcohol. It may increase your risk of stomach bleeding. Avoid taking aspirin unless your doctor tells you to. Ask a doctor or pharmacist before using other medicines for pain, fever, swelling, or cold/flu symptoms. They may contain ingredients similar to naproxen (such as aspirin, ibuprofen, or ketoprofen). Ask your doctor before using an antacid, and use only the type your doctor recommends. Some antacids can make it harder for your body to absorb naproxen. What are the possible side effects of naproxen? Get emergency medical help if you have signs of an allergic reaction (runny or stuffy nose, wheezing or trouble breathing, hives, swelling in your face or throat) or a severe skin reaction (fever, sore throat, burning eyes, skin pain, red or purple skin rash with blistering and peeling). Get emergency medical help if you have signs of a heart attack or stroke: chest pain spreading to your jaw or shoulder, sudden numbness or weakness on one side of the body, slurred speech, leg swelling, feeling short of breath. Stop using naproxen and call your doctor at once if you have:  · shortness of breath (even with mild exertion); · swelling or rapid weight gain;  · the first sign of any skin rash or blister, no matter how mild;  · signs of stomach bleeding --bloody or tarry stools, coughing up blood or vomit that looks like coffee grounds;  · liver problems --nausea, upper stomach pain, loss of appetite, dark urine, kori-colored stools, jaundice (yellowing of the skin or eyes);  · kidney problems --little or no urination, painful urination, swelling in your feet or ankles; or  · low red blood cells (anemia) --pale skin, unusual tiredness, feeling light-headed or short of breath, cold hands and feet.   Common side effects may include:  · headache;  · indigestion, heartburn, stomach pain; or  · flu symptoms; This is not a complete list of side effects and others may occur. Call your doctor for medical advice about side effects. You may report side effects to FDA at 7-968-KML-1205. What other drugs will affect naproxen? Ask your doctor before using naproxen if you take an antidepressant. Taking certain antidepressants with an NSAID may cause you to bruise or bleed easily. Ask a doctor or pharmacist before using naproxen with any other medications, especially:  · other NSAIDs or salicylates (diflunisal, salsalate);  · antacids and sucralfate;  · cholestyramine;  · cyclosporine;  · digoxin;  · lithium;  · methotrexate;  · pemetrexed;  · probenecid;  · warfarin (Coumadin, Bethanne Shaina) or similar blood thinners;  · a diuretic or \"water pill\"; or  · heart or blood pressure medication. This list is not complete. Other drugs may affect naproxen, including prescription and over-the-counter medicines, vitamins, and herbal products. Not all possible drug interactions are listed here. Where can I get more information? Your pharmacist can provide more information about naproxen. Remember, keep this and all other medicines out of the reach of children, never share your medicines with others, and use this medication only for the indication prescribed. Every effort has been made to ensure that the information provided by [address] is accurate, up-to-date, and complete, but no guarantee is made to that effect. Drug information contained herein may be time sensitive. [facility] information has been compiled for use by healthcare practitioners and consumers in the [place] and therefore [facility] does not warrant that uses outside of the [place] are appropriate, unless specifically indicated otherwise. [facility]'s drug information does not endorse drugs, diagnose patients or recommend therapy. [facility]'s drug information is an informational resource designed to assist licensed healthcare practitioners in caring for their patients and/or to serve consumers viewing this service as a supplement to, and not a substitute for, the expertise, skill, knowledge and judgment of healthcare practitioners. The absence of a warning for a given drug or drug combination in no way should be construed to indicate that the drug or drug combination is safe, effective or appropriate for any given patient. [facility] does not assume any responsibility for any aspect of healthcare administered with the aid of information [facility] provides. The information contained herein is not intended to cover all possible uses, directions, precautions, warnings, drug interactions, allergic reactions, or adverse effects. If you have questions about the drugs you are taking, check with your doctor, nurse or pharmacist.  Copyright 9039-2345 [address] [place]. Version: 16.02. Revision date: 1/24/2020. Care instructions adapted under license by [facility] ([facility]). If you have questions about a medical condition or this instruction, always ask your healthcare professional. [place] [address] any warranty or liability for your use of this information.

## 2021-05-19 ENCOUNTER — TELEPHONE (OUTPATIENT)
Dept: INTERNAL MEDICINE CLINIC | Age: 46
End: 2021-05-19

## 2021-05-19 DIAGNOSIS — I10 ESSENTIAL HYPERTENSION: ICD-10-CM

## 2021-05-19 RX ORDER — LISINOPRIL 10 MG/1
TABLET ORAL
Qty: 90 TABLET | Refills: 3 | Status: SHIPPED | OUTPATIENT
Start: 2021-05-19 | End: 2022-01-12 | Stop reason: SDUPTHER

## 2021-05-19 NOTE — TELEPHONE ENCOUNTER
PT called to make an appt because [facility] won't fill her blood pressure medicine without making an appt. She has one scheduled for May 26th for a VV visit. Please send a refill over to [facility] thanks.

## 2021-05-26 ENCOUNTER — VIRTUAL VISIT (OUTPATIENT)
Dept: INTERNAL MEDICINE CLINIC | Age: 46
End: 2021-05-26
Payer: COMMERCIAL

## 2021-05-26 DIAGNOSIS — Z00.00 WELL ADULT EXAM: ICD-10-CM

## 2021-05-26 DIAGNOSIS — I10 ESSENTIAL HYPERTENSION: ICD-10-CM

## 2021-05-26 DIAGNOSIS — E61.1 IRON DEFICIENCY: ICD-10-CM

## 2021-05-26 DIAGNOSIS — F41.9 ANXIETY: Primary | ICD-10-CM

## 2021-05-26 DIAGNOSIS — E55.9 VITAMIN D INSUFFICIENCY: ICD-10-CM

## 2021-05-26 PROCEDURE — 99214 OFFICE O/P EST MOD 30 MIN: CPT | Performed by: INTERNAL MEDICINE

## 2021-05-26 RX ORDER — CITALOPRAM 20 MG/1
20 TABLET ORAL DAILY
Qty: 90 TABLET | Refills: 3 | Status: SHIPPED | OUTPATIENT
Start: 2021-05-26 | End: 2022-01-12 | Stop reason: SDUPTHER

## 2021-05-26 NOTE — PROGRESS NOTES
Reji Savage (:  1975) is a 54 y.o. female,Established patient, here for evaluation of the following chief complaint(s): Check-Up (med check )         ASSESSMENT/PLAN:  1. Anxiety  Assessment & Plan:   Continue citalopram 20 mg daily. Had stopped it earlier in  but then had recurrent anxiety and has since resumed citalopram.  2. Well adult exam  -     citalopram (CELEXA) 20 MG tablet; Take 1 tablet by mouth daily, Disp-90 tablet, R-3Normal  -     CBC Auto Differential; Future  -     Comprehensive Metabolic Panel; Future  -     Lipid Panel; Future  -     TSH with Reflex; Future  -     Vitamin D 25 Hydroxy; Future  3. Essential hypertension  Assessment & Plan:  Continue lisinopril 10 mg daily and hydrochlorothiazide 25 mg daily. 4. Vitamin D insufficiency  Assessment & Plan:   Check vitamin D level. Orders:  -     Vitamin D 25 Hydroxy; Future  5. Iron deficiency  Assessment & Plan:  Check CBC. Orders:  -     Ferritin; Future      Return in about 2 months (around 2021) for wellness exam w/labs prior (sometime over the next 3 months). SUBJECTIVE/OBJECTIVE:  HPI     VV today. Has been losing weight using [employer]- meal plan with snacks and 1 protein and 3 veggies that she provides. Has lost 30 pounds since March. Was having issues with her back and \"screwed up\" her entire right leg d/t back pain. Ankle and knee were injured. Ankle improved. Did see ortho (Dr. Kandace Buck at [facility]) for her knee, has a torn meniscus. Has been going to PT and had a cortisone injection in her knee. Surgery is the next option if she is not better. Has a f/u tomorrow. Has not been able to exercise d/t her knee. Did go back ok citalopram in September. Had side effects (paresthesias) with Buspar. No issues with her blood pressure. Remains on HCTZ and lisinopril w/o difficulty. Review of Systems   Constitutional: Negative for appetite change, fatigue and fever.    Respiratory: Negative for chest encounter. The patient (or guardian if applicable) is aware that this is a billable service. Verbal consent to proceed has been obtained within the past 12 months. The visit was conducted pursuant to the emergency declaration under the [address] [place] [address] authority and the [employer] Act. Patient identification was verified, and a caregiver was present when appropriate. The patient was located in a state where the provider was credentialed to provide care.       An electronic signature was used to authenticate this note.    --[address] [place] [address]

## 2021-05-31 ASSESSMENT — ENCOUNTER SYMPTOMS
DIARRHEA: 0
CONSTIPATION: 0
SHORTNESS OF BREATH: 0
CHEST TIGHTNESS: 0

## 2021-07-06 ENCOUNTER — PATIENT MESSAGE (OUTPATIENT)
Dept: INTERNAL MEDICINE CLINIC | Age: 46
End: 2021-07-06

## 2021-07-07 NOTE — TELEPHONE ENCOUNTER
From: Quan Kingston  To: Savanna Dodson DO  Sent: 7/6/2021 5:23 PM EDT  Subject: Non-Urgent Oval Fairtoncarson Aguillon,    I have an appointment with you in the 14th, but Im supposed to get blood work done before coming in. If I get blood work completed at your office this Thursday or Friday, will that be enough time for you to get the results by the 14th? Also, would it be possible to get my hormones checked and all the thyroid tests run? Would I need any orders if I go to your office for the blood draw? Thanks and have a great evening!     Caitlyn Hernandez

## 2021-07-08 DIAGNOSIS — N92.6 IRREGULAR MENSES: ICD-10-CM

## 2021-07-08 DIAGNOSIS — R73.9 HYPERGLYCEMIA: Primary | ICD-10-CM

## 2021-07-12 DIAGNOSIS — Z00.00 WELL ADULT EXAM: ICD-10-CM

## 2021-07-12 DIAGNOSIS — E61.1 IRON DEFICIENCY: ICD-10-CM

## 2021-07-12 DIAGNOSIS — N92.6 IRREGULAR MENSES: ICD-10-CM

## 2021-07-12 DIAGNOSIS — E55.9 VITAMIN D INSUFFICIENCY: ICD-10-CM

## 2021-07-12 DIAGNOSIS — R73.9 HYPERGLYCEMIA: ICD-10-CM

## 2021-07-12 LAB
A/G RATIO: 1.8 (ref 1.1–2.2)
ALBUMIN SERPL-MCNC: 4.3 G/DL (ref 3.4–5)
ALP BLD-CCNC: 84 U/L (ref 40–129)
ALT SERPL-CCNC: 9 U/L (ref 10–40)
ANION GAP SERPL CALCULATED.3IONS-SCNC: 13 MMOL/L (ref 3–16)
AST SERPL-CCNC: 12 U/L (ref 15–37)
BASOPHILS ABSOLUTE: 0.1 K/UL (ref 0–0.2)
BASOPHILS RELATIVE PERCENT: 0.9 %
BILIRUB SERPL-MCNC: 0.6 MG/DL (ref 0–1)
BUN BLDV-MCNC: 18 MG/DL (ref 7–20)
CALCIUM SERPL-MCNC: 9.3 MG/DL (ref 8.3–10.6)
CHLORIDE BLD-SCNC: 99 MMOL/L (ref 99–110)
CHOLESTEROL, TOTAL: 191 MG/DL (ref 0–199)
CO2: 26 MMOL/L (ref 21–32)
CREAT SERPL-MCNC: 0.7 MG/DL (ref 0.6–1.1)
EOSINOPHILS ABSOLUTE: 0.1 K/UL (ref 0–0.6)
EOSINOPHILS RELATIVE PERCENT: 1.5 %
ESTRADIOL LEVEL: 24 PG/ML
FOLLICLE STIMULATING HORMONE: 9.4 MIU/ML
GFR AFRICAN AMERICAN: >60
GFR NON-AFRICAN AMERICAN: >60
GLOBULIN: 2.4 G/DL
GLUCOSE BLD-MCNC: 80 MG/DL (ref 70–99)
HCT VFR BLD CALC: 40.1 % (ref 36–48)
HDLC SERPL-MCNC: 63 MG/DL (ref 40–60)
HEMOGLOBIN: 13.5 G/DL (ref 12–16)
LDL CHOLESTEROL CALCULATED: 116 MG/DL
LUTEINIZING HORMONE: 5.7 MIU/ML
LYMPHOCYTES ABSOLUTE: 1.2 K/UL (ref 1–5.1)
LYMPHOCYTES RELATIVE PERCENT: 21.5 %
MCH RBC QN AUTO: 29.9 PG (ref 26–34)
MCHC RBC AUTO-ENTMCNC: 33.6 G/DL (ref 31–36)
MCV RBC AUTO: 89 FL (ref 80–100)
MONOCYTES ABSOLUTE: 0.4 K/UL (ref 0–1.3)
MONOCYTES RELATIVE PERCENT: 6.3 %
NEUTROPHILS ABSOLUTE: 3.9 K/UL (ref 1.7–7.7)
NEUTROPHILS RELATIVE PERCENT: 69.8 %
PDW BLD-RTO: 14.3 % (ref 12.4–15.4)
PLATELET # BLD: 315 K/UL (ref 135–450)
PMV BLD AUTO: 8.3 FL (ref 5–10.5)
POTASSIUM SERPL-SCNC: 4.3 MMOL/L (ref 3.5–5.1)
RBC # BLD: 4.5 M/UL (ref 4–5.2)
SODIUM BLD-SCNC: 138 MMOL/L (ref 136–145)
TOTAL PROTEIN: 6.7 G/DL (ref 6.4–8.2)
TRIGL SERPL-MCNC: 61 MG/DL (ref 0–150)
VLDLC SERPL CALC-MCNC: 12 MG/DL
WBC # BLD: 5.6 K/UL (ref 4–11)

## 2021-07-13 LAB
ESTIMATED AVERAGE GLUCOSE: 105.4 MG/DL
FERRITIN: 66.7 NG/ML (ref 15–150)
HBA1C MFR BLD: 5.3 %
TSH REFLEX: 1.85 UIU/ML (ref 0.27–4.2)
VITAMIN D 25-HYDROXY: 51.7 NG/ML

## 2021-07-14 ENCOUNTER — OFFICE VISIT (OUTPATIENT)
Dept: INTERNAL MEDICINE CLINIC | Age: 46
End: 2021-07-14
Payer: COMMERCIAL

## 2021-07-14 VITALS
SYSTOLIC BLOOD PRESSURE: 110 MMHG | BODY MASS INDEX: 45.59 KG/M2 | HEART RATE: 75 BPM | DIASTOLIC BLOOD PRESSURE: 84 MMHG | WEIGHT: 265.6 LBS | OXYGEN SATURATION: 95 %

## 2021-07-14 DIAGNOSIS — Z12.11 COLON CANCER SCREENING: ICD-10-CM

## 2021-07-14 DIAGNOSIS — Z00.00 WELL ADULT EXAM: Primary | ICD-10-CM

## 2021-07-14 DIAGNOSIS — Z83.71 FAMILY HISTORY OF COLONIC POLYPS: ICD-10-CM

## 2021-07-14 DIAGNOSIS — F41.9 ANXIETY: ICD-10-CM

## 2021-07-14 DIAGNOSIS — I10 ESSENTIAL HYPERTENSION: ICD-10-CM

## 2021-07-14 PROBLEM — R20.2 RIGHT HAND PARESTHESIA: Status: RESOLVED | Noted: 2018-06-26 | Resolved: 2021-07-14

## 2021-07-14 PROBLEM — F43.9 STRESS: Status: RESOLVED | Noted: 2019-02-05 | Resolved: 2021-07-14

## 2021-07-14 PROBLEM — H53.483 TUNNEL VISUAL FIELD CONSTRICTION OF BOTH EYES: Status: RESOLVED | Noted: 2018-06-26 | Resolved: 2021-07-14

## 2021-07-14 PROBLEM — H00.011 HORDEOLUM EXTERNUM OF RIGHT UPPER EYELID: Status: RESOLVED | Noted: 2019-12-26 | Resolved: 2021-07-14

## 2021-07-14 PROCEDURE — 99396 PREV VISIT EST AGE 40-64: CPT | Performed by: INTERNAL MEDICINE

## 2021-07-14 ASSESSMENT — ENCOUNTER SYMPTOMS
SHORTNESS OF BREATH: 0
CHEST TIGHTNESS: 0
CONSTIPATION: 0
DIARRHEA: 0

## 2021-07-14 NOTE — ASSESSMENT & PLAN NOTE
No problems identified on exam.  Patient is up-to-date on mammogram.  She is overdue for Pap smear and will schedule follow-up with Dr. Sophia Wynn. Patient has a family history of colon polyps in her father. Colonoscopy referral placed today. Lab work reviewed with patient. Discussed results of FSH, LH and estradiol. She does not appear to be perimenopausal based on blood work. Continue to work on weight loss. Discussed exercise.

## 2021-07-14 NOTE — ASSESSMENT & PLAN NOTE
Very well controlled on lisinopril 10 mg daily and hydrochlorothiazide 25 mg daily. Monitor blood pressures patient continues to lose weight. Discussed that weight loss may or may not result in discontinuation of blood pressure medication.

## 2021-07-15 LAB
INSULIN FREE: 7 UIU/ML (ref 3–19)
INSULIN: 7 UIU/ML (ref 3–19)

## 2021-08-13 PROBLEM — Z00.00 WELL ADULT EXAM: Status: RESOLVED | Noted: 2021-07-14 | Resolved: 2021-08-13

## 2021-09-25 ENCOUNTER — HOSPITAL ENCOUNTER (OUTPATIENT)
Dept: WOMENS IMAGING | Age: 46
Discharge: HOME | End: 2021-09-25
Payer: COMMERCIAL

## 2021-09-25 VITALS — HEIGHT: 66 IN | BODY MASS INDEX: 41.78 KG/M2 | WEIGHT: 260 LBS

## 2021-09-25 DIAGNOSIS — Z12.31 ENCOUNTER FOR SCREENING MAMMOGRAM FOR BREAST CANCER: ICD-10-CM

## 2021-09-25 PROCEDURE — 77063 BREAST TOMOSYNTHESIS BI: CPT

## 2021-11-01 ENCOUNTER — VIRTUAL VISIT (OUTPATIENT)
Dept: INTERNAL MEDICINE CLINIC | Age: 46
End: 2021-11-01
Payer: COMMERCIAL

## 2021-11-01 DIAGNOSIS — J01.90 ACUTE NON-RECURRENT SINUSITIS, UNSPECIFIED LOCATION: Primary | ICD-10-CM

## 2021-11-01 DIAGNOSIS — J06.9 VIRAL URI WITH COUGH: Primary | ICD-10-CM

## 2021-11-01 PROCEDURE — 99214 OFFICE O/P EST MOD 30 MIN: CPT | Performed by: NURSE PRACTITIONER

## 2021-11-01 RX ORDER — BENZONATATE 100 MG/1
100 CAPSULE ORAL 3 TIMES DAILY PRN
Qty: 30 CAPSULE | Refills: 0 | Status: SHIPPED | OUTPATIENT
Start: 2021-11-01 | End: 2021-11-08

## 2021-11-01 RX ORDER — AMOXICILLIN AND CLAVULANATE POTASSIUM 875; 125 MG/1; MG/1
1 TABLET, FILM COATED ORAL 2 TIMES DAILY
Qty: 20 TABLET | Refills: 0 | Status: SHIPPED | OUTPATIENT
Start: 2021-11-01 | End: 2021-11-11

## 2021-11-01 SDOH — ECONOMIC STABILITY: FOOD INSECURITY: WITHIN THE PAST 12 MONTHS, THE FOOD YOU BOUGHT JUST DIDN'T LAST AND YOU DIDN'T HAVE MONEY TO GET MORE.: NEVER TRUE

## 2021-11-01 SDOH — ECONOMIC STABILITY: FOOD INSECURITY: WITHIN THE PAST 12 MONTHS, YOU WORRIED THAT YOUR FOOD WOULD RUN OUT BEFORE YOU GOT MONEY TO BUY MORE.: NEVER TRUE

## 2021-11-01 ASSESSMENT — SOCIAL DETERMINANTS OF HEALTH (SDOH): HOW HARD IS IT FOR YOU TO PAY FOR THE VERY BASICS LIKE FOOD, HOUSING, MEDICAL CARE, AND HEATING?: NOT HARD AT ALL

## 2021-11-01 NOTE — PROGRESS NOTES
2021    TELEHEALTH EVALUATION -- Audio/Visual (During HJFK-34 public health emergency)    HPI:    Jose Rodas (:  1975) has requested an audio/video evaluation for the following concern(s):    VV for acute URI symptoms   Started Thursday as a sore throat (resolved), now having sinus pressure, headaches, cough (slightly better today but is worse at night), PND, some body aches and some fatigue. Denies fever, chills, dyspnea. Using mucinex, advil and tylenol without much relief. Denies know exposures to flu or covid   Has been vaccinated for covid not vaccinated for flu   Using daily antihistamine and nasal spray for allergies - not helping. Review of Systems   Negative     Prior to Visit Medications    Medication Sig Taking? Authorizing Provider   benzonatate (TESSALON) 100 MG capsule Take 1 capsule by mouth 3 times daily as needed for Cough Yes iMke Montero, APRN - CNP   citalopram (CELEXA) 20 MG tablet Take 1 tablet by mouth daily Yes Samuel Villarreal DO   lisinopril (PRINIVIL;ZESTRIL) 10 MG tablet TAKE 1 TABLET BY MOUTH EVERY DAY Yes Samuel Villarreal DO   hydroCHLOROthiazide (HYDRODIURIL) 25 MG tablet TAKE 1 TABLET DAILY Yes Samuel Villarreal DO   Cetirizine HCl (ZYRTEC ALLERGY PO) Take by mouth Yes Historical Provider, MD   fluticasone (FLONASE) 50 MCG/ACT nasal spray 1 spray by Nasal route daily Yes Historical Provider, MD     Social History     Tobacco Use    Smoking status: Never Smoker    Smokeless tobacco: Never Used   Substance Use Topics    Alcohol use: Yes     Comment: rarely    Drug use: No          PHYSICAL EXAMINATION:  [ INSTRUCTIONS:  \"[x]\" Indicates a positive item  \"[]\" Indicates a negative item  -- DELETE ALL ITEMS NOT EXAMINED]  Vital Signs: (As obtained by patient/caregiver or practitioner observation)    Patient-Reported Vitals 2021   Patient-Reported Weight 262lbs       Physical Exam  Constitutional:       General: She is not in acute distress. Appearance: Normal appearance. She is ill-appearing (not acutely ). HENT:      Head: Normocephalic and atraumatic. Nose:      Comments: Sound congested  Pulmonary:      Effort: Pulmonary effort is normal. No respiratory distress. Comments: No cough of conversation dyspnea noted  Neurological:      General: No focal deficit present. Mental Status: She is alert and oriented to person, place, and time. Mental status is at baseline. Psychiatric:         Mood and Affect: Mood normal.         Behavior: Behavior normal.        Other pertinent observable physical exam findings-     Due to this being a TeleHealth encounter, evaluation of the following organ systems is limited: Vitals/Constitutional/EENT/Resp/CV/GI//MS/Neuro/Skin/Heme-Lymph-Imm. ASSESSMENT/PLAN:  Viral URI with cough  Uncontrolled  No signs of bacterial infection today   Reviewed viral vs bacterial infection with pt - requesting antibiotic today, declined   Discussed flu and covid testing - pt wants to go to [facility] in [place] for testing   - benzonatate (TESSALON) 100 MG capsule; Take 1 capsule by mouth 3 times daily as needed for Cough  Dispense: 30 capsule; Refill: 0  Supportive care reviewed  Humidified air  Honey lemon tea  Nasal rinse prn  Flonase daily  NSAIDs/ tylenol for pain and fever  Mucinex prn for congestion   transmission precautions reviewed       Reviewed follow up criteria     An  electronic signature was used to authenticate this note. --JARED Morris - CNP on 11/1/2021 at 8:45 AM        Pursuant to the emergency declaration under the [facility], [facility] waiver authority and the [employer] and [employer] Act, this Virtual  Visit was conducted, with patient's consent, to reduce the patient's risk of exposure to COVID-19 and provide continuity of care for an established patient.     Services were provided through a video synchronous discussion virtually to substitute for in-person clinic visit.

## 2021-11-21 DIAGNOSIS — I10 ESSENTIAL HYPERTENSION: ICD-10-CM

## 2021-11-22 RX ORDER — HYDROCHLOROTHIAZIDE 25 MG/1
TABLET ORAL
Qty: 90 TABLET | Refills: 3 | Status: SHIPPED | OUTPATIENT
Start: 2021-11-22 | End: 2022-08-23 | Stop reason: SDUPTHER

## 2022-01-12 ENCOUNTER — OFFICE VISIT (OUTPATIENT)
Dept: INTERNAL MEDICINE CLINIC | Age: 47
End: 2022-01-12
Payer: COMMERCIAL

## 2022-01-12 VITALS
HEART RATE: 84 BPM | DIASTOLIC BLOOD PRESSURE: 84 MMHG | WEIGHT: 267.2 LBS | BODY MASS INDEX: 43.13 KG/M2 | OXYGEN SATURATION: 98 % | SYSTOLIC BLOOD PRESSURE: 128 MMHG

## 2022-01-12 DIAGNOSIS — I10 ESSENTIAL HYPERTENSION: ICD-10-CM

## 2022-01-12 DIAGNOSIS — Z00.00 WELL ADULT EXAM: ICD-10-CM

## 2022-01-12 DIAGNOSIS — M25.561 ACUTE PAIN OF RIGHT KNEE: ICD-10-CM

## 2022-01-12 DIAGNOSIS — E66.01 CLASS 3 SEVERE OBESITY WITHOUT SERIOUS COMORBIDITY WITH BODY MASS INDEX (BMI) OF 50.0 TO 59.9 IN ADULT, UNSPECIFIED OBESITY TYPE (HCC): ICD-10-CM

## 2022-01-12 DIAGNOSIS — F41.9 ANXIETY: Primary | ICD-10-CM

## 2022-01-12 PROCEDURE — 99213 OFFICE O/P EST LOW 20 MIN: CPT | Performed by: INTERNAL MEDICINE

## 2022-01-12 RX ORDER — LISINOPRIL 10 MG/1
TABLET ORAL
Qty: 90 TABLET | Refills: 3 | Status: SHIPPED | OUTPATIENT
Start: 2022-01-12 | End: 2022-08-23 | Stop reason: SDUPTHER

## 2022-01-12 RX ORDER — CITALOPRAM 20 MG/1
20 TABLET ORAL DAILY
Qty: 90 TABLET | Refills: 3 | Status: SHIPPED | OUTPATIENT
Start: 2022-01-12 | End: 2022-08-23 | Stop reason: SDUPTHER

## 2022-01-12 ASSESSMENT — ENCOUNTER SYMPTOMS
CONSTIPATION: 0
CHEST TIGHTNESS: 0
DIARRHEA: 0
SHORTNESS OF BREATH: 0

## 2022-01-12 ASSESSMENT — PATIENT HEALTH QUESTIONNAIRE - PHQ9
SUM OF ALL RESPONSES TO PHQ QUESTIONS 1-9: 0
2. FEELING DOWN, DEPRESSED OR HOPELESS: 0
SUM OF ALL RESPONSES TO PHQ9 QUESTIONS 1 & 2: 0
SUM OF ALL RESPONSES TO PHQ QUESTIONS 1-9: 0
1. LITTLE INTEREST OR PLEASURE IN DOING THINGS: 0
SUM OF ALL RESPONSES TO PHQ QUESTIONS 1-9: 0
SUM OF ALL RESPONSES TO PHQ QUESTIONS 1-9: 0

## 2022-01-12 NOTE — PROGRESS NOTES
Patient: Viviane Mendez is a 51 y.o. female who presents today with the following Chief Complaint(s):  Chief Complaint   Patient presents with    Check-Up       HPI       Here today for follow up. Is working on diet- has started back on keto with tracking. Did lose 40 pounds with [employer]. Has not been exercising d/t knee pain. Has had cortisone injection, PT. Thinks that she is going to need surgery. Cannot exercise d/t pain. Did have COVID in late October/early November. Anxiety and mood are doing well on Celexa. No issues with lisinopril that she is taking for blood pressure.     Wt Readings from Last 3 Encounters:   22 267 lb 3.2 oz (121.2 kg)   21 260 lb (117.9 kg)   21 265 lb 9.6 oz (120.5 kg)     Wt Readings from Last 3 Encounters:   22 267 lb 3.2 oz (121.2 kg)   21 260 lb (117.9 kg)   21 265 lb 9.6 oz (120.5 kg)     Temp Readings from Last 3 Encounters:   21 96.7 °F (35.9 °C)   20 98.3 °F (36.8 °C)   20 98.2 °F (36.8 °C)     BP Readings from Last 3 Encounters:   22 128/84   21 110/84   21 124/80     Pulse Readings from Last 3 Encounters:   22 84   21 75   21 98         Allergies   Allergen Reactions    Buspar [Buspirone] Other (See Comments)     Numbness and tingling       Past Medical History:   Diagnosis Date    Anxiety     HTN (hypertension)     Vertigo       Past Surgical History:   Procedure Laterality Date     SECTION  ,      Social History     Socioeconomic History    Marital status:      Spouse name: Not on file    Number of children: Not on file    Years of education: Not on file    Highest education level: Not on file   Occupational History    Occupation: teacher     Employer: [employer]   Tobacco Use    Smoking status: Never Smoker    Smokeless tobacco: Never Used   Substance and Sexual Activity    Alcohol use: Yes     Comment: rarely    Drug use: No    Sexual activity: Not on file   Other Topics Concern    Not on file   Social History Narrative    Not on file     Social Determinants of Health     Financial Resource Strain: Low Risk     Difficulty of Paying Living Expenses: Not hard at all   Food Insecurity: No Food Insecurity    Worried About Running Out of Food in the Last Year: Never true    [address] in the Last Year: Never true   Transportation Needs:     Lack of Transportation (Medical): Not on file    Lack of Transportation (Non-Medical):  Not on file   Physical Activity:     Days of Exercise per Week: Not on file    Minutes of Exercise per Session: Not on file   Stress:     Feeling of Stress : Not on file   Social Connections:     Frequency of Communication with Friends and Family: Not on file    Frequency of Social Gatherings with Friends and Family: Not on file    Attends Spiritism Services: Not on file    Active Member of [address] [place] [address] [employer] or Organizations: Not on file    Attends Club or Organization Meetings: Not on file    Marital Status: Not on file   Intimate Partner Violence:     Fear of Current or Ex-Partner: Not on file    Emotionally Abused: Not on file    Physically Abused: Not on file    Sexually Abused: Not on file   Housing Stability:     Unable to Pay for Housing in the Last Year: Not on file    Number of [place] in the Last Year: Not on file    Unstable Housing in the Last Year: Not on file     Family History   Problem Relation Age of Onset    Psoriasis Mother     Arthritis Mother         psoriatic    Hypertension Father         Outpatient Medications Prior to Visit   Medication Sig Dispense Refill    hydroCHLOROthiazide (HYDRODIURIL) 25 MG tablet TAKE 1 TABLET DAILY 90 tablet 3    Cetirizine HCl (ZYRTEC ALLERGY PO) Take by mouth      fluticasone (FLONASE) 50 MCG/ACT nasal spray 1 spray by Nasal route daily      citalopram (CELEXA) 20 MG tablet Take 1 tablet by mouth daily 90 tablet 3    lisinopril (PRINIVIL;ZESTRIL) 10 MG tablet TAKE 1 TABLET BY MOUTH EVERY DAY 90 tablet 3     No facility-administered medications prior to visit. Patient'spast medical history, surgical history, family history, medications,  and allergies  were all reviewed and updated as appropriate today. Review of Systems   Constitutional: Negative for appetite change, fatigue and fever. Respiratory: Negative for chest tightness and shortness of breath. Cardiovascular: Negative for chest pain. Gastrointestinal: Negative for constipation and diarrhea. Skin: Negative for rash. /84   Pulse 84   Wt 267 lb 3.2 oz (121.2 kg)   SpO2 98%   BMI 43.13 kg/m²   Physical Exam  Vitals and nursing note reviewed. Constitutional:       Appearance: She is well-developed. She is not toxic-appearing. HENT:      Head: Normocephalic. Right Ear: Tympanic membrane, ear canal and external ear normal.      Left Ear: Tympanic membrane, ear canal and external ear normal.      Mouth/Throat:      Pharynx: No oropharyngeal exudate or posterior oropharyngeal erythema. Eyes:      General: No scleral icterus. Extraocular Movements: Extraocular movements intact. Conjunctiva/sclera: Conjunctivae normal.      Pupils: Pupils are equal, round, and reactive to light. Neck:      Thyroid: No thyroid mass or thyromegaly. Vascular: No carotid bruit. Cardiovascular:      Rate and Rhythm: Normal rate and regular rhythm. Heart sounds: Normal heart sounds. No murmur heard. Pulmonary:      Effort: Pulmonary effort is normal.      Breath sounds: Normal breath sounds. Musculoskeletal:      Right lower leg: No edema. Left lower leg: No edema. Lymphadenopathy:      Cervical: No cervical adenopathy. Neurological:      General: No focal deficit present. Mental Status: She is alert and oriented to person, place, and time.    Psychiatric:         Mood and Affect: Mood normal.         Behavior: Behavior normal. Behavior is cooperative. ASSESSMENT/PLAN:    Problem List Items Addressed This Visit     Essential hypertension      Well-controlled on lisinopril 10 mg daily and hydrochlorothiazide 25 mg daily. Relevant Medications    lisinopril (PRINIVIL;ZESTRIL) 10 MG tablet    Other Relevant Orders    BASIC METABOLIC PANEL    Class 3 severe obesity without serious comorbidity with body mass index (BMI) of 50.0 to 59.9 in adult [facility])     Continue working on diet and weight loss. Exercise is limited due to knee pain. Anxiety - Primary      Doing well on citalopram 20 mg daily. Continue. Relevant Medications    citalopram (CELEXA) 20 MG tablet    Acute pain of right knee      Continues to struggle with knee pain. Is likely going to require surgery. Encourage patient to reach out to Ortho and get surgery scheduled for early in her summer break to allow ample time for physical therapy as well. Other Visit Diagnoses     Well adult exam        Relevant Medications    citalopram (CELEXA) 20 MG tablet    Other Relevant Orders    CBC Auto Differential    Comprehensive Metabolic Panel    Lipid Panel    TSH with Reflex    Vitamin D 25 Hydroxy          Current Outpatient Medications   Medication Sig Dispense Refill    citalopram (CELEXA) 20 MG tablet Take 1 tablet by mouth daily 90 tablet 3    lisinopril (PRINIVIL;ZESTRIL) 10 MG tablet TAKE 1 TABLET BY MOUTH EVERY DAY 90 tablet 3    hydroCHLOROthiazide (HYDRODIURIL) 25 MG tablet TAKE 1 TABLET DAILY 90 tablet 3    Cetirizine HCl (ZYRTEC ALLERGY PO) Take by mouth      fluticasone (FLONASE) 50 MCG/ACT nasal spray 1 spray by Nasal route daily       No current facility-administered medications for this visit. Return in about 6 months (around 7/12/2022) for wellness.

## 2022-01-16 PROBLEM — R53.83 OTHER FATIGUE: Status: RESOLVED | Noted: 2019-12-26 | Resolved: 2022-01-16

## 2022-01-16 PROBLEM — M25.561 ACUTE PAIN OF RIGHT KNEE: Status: ACTIVE | Noted: 2022-01-16

## 2022-01-16 PROBLEM — Z71.85 VACCINE COUNSELING: Status: RESOLVED | Noted: 2019-12-26 | Resolved: 2022-01-16

## 2022-01-17 NOTE — ASSESSMENT & PLAN NOTE
Continues to struggle with knee pain. Is likely going to require surgery. Encourage patient to reach out to Ortho and get surgery scheduled for early in her summer break to allow ample time for physical therapy as well.

## 2022-03-28 ENCOUNTER — TELEMEDICINE (OUTPATIENT)
Dept: INTERNAL MEDICINE CLINIC | Age: 47
End: 2022-03-28
Payer: COMMERCIAL

## 2022-03-28 DIAGNOSIS — J06.9 ACUTE URI: Primary | ICD-10-CM

## 2022-03-28 PROCEDURE — 99213 OFFICE O/P EST LOW 20 MIN: CPT | Performed by: NURSE PRACTITIONER

## 2022-03-28 RX ORDER — AZITHROMYCIN 250 MG/1
250 TABLET, FILM COATED ORAL SEE ADMIN INSTRUCTIONS
Qty: 6 TABLET | Refills: 0 | Status: SHIPPED | OUTPATIENT
Start: 2022-03-28 | End: 2022-04-02

## 2022-03-28 NOTE — PROGRESS NOTES
3/28/2022    TELEHEALTH EVALUATION -- Audio/Visual (During PSWBF-27 public health emergency)    Chief Complaint   Patient presents with    Sinusitis     2 weeks. Congestion, cough, ear fullness and pain, headache      HPI:    Osman Abbott (:  1975) has requested an audio/video evaluation for the following concern(s):    VV with a 2-3 week history of cough, congestion, PND, throat feels irritated from drainage, ear pressure, sinus pain, headaches   Denies fever, chills, SOB. Denies any known exposures to flu or covid   Symptoms are worsening the past few days   Pt has been using advil, zyrtec, flonase without much relief     Review of Systems  Negative other than HPI    Prior to Visit Medications    Medication Sig Taking? Authorizing Provider   azithromycin (ZITHROMAX) 250 MG tablet Take 1 tablet by mouth See Admin Instructions for 5 days 500mg on day 1 followed by 250mg on days 2 - 5 Yes Heidy Jarvis APRN - CNP   citalopram (CELEXA) 20 MG tablet Take 1 tablet by mouth daily Yes [facility] [place], DO   lisinopril (PRINIVIL;ZESTRIL) 10 MG tablet TAKE 1 TABLET BY MOUTH EVERY DAY Yes [employer], DO   hydroCHLOROthiazide (HYDRODIURIL) 25 MG tablet TAKE 1 TABLET DAILY Yes [employer], DO   Cetirizine HCl (ZYRTEC ALLERGY PO) Take by mouth Yes Historical Provider, MD   fluticasone (FLONASE) 50 MCG/ACT nasal spray 1 spray by Nasal route daily Yes Historical Provider, MD       Social History     Tobacco Use    Smoking status: Never Smoker    Smokeless tobacco: Never Used   Substance Use Topics    Alcohol use: Yes     Comment: rarely    Drug use:  No            PHYSICAL EXAMINATION:  [ INSTRUCTIONS:  \"[x]\" Indicates a positive item  \"[]\" Indicates a negative item  -- DELETE ALL ITEMS NOT EXAMINED]  Vital Signs: (As obtained by patient/caregiver or practitioner observation)    Patient-Reported Vitals 2021   Patient-Reported Weight 262lbs         Physical Exam  Constitutional: General: She is not in acute distress. Appearance: Normal appearance. She is ill-appearing (mild and not acute ). HENT:      Head: Normocephalic and atraumatic. Nose:      Comments: Sounds congested. Reports sinus TTP   Pulmonary:      Effort: Pulmonary effort is normal. No respiratory distress. Neurological:      General: No focal deficit present. Mental Status: She is alert and oriented to person, place, and time. Mental status is at baseline. Psychiatric:         Mood and Affect: Mood normal.         Behavior: Behavior normal.        Other pertinent observable physical exam findings-     Due to this being a TeleHealth encounter, evaluation of the following organ systems is limited: Vitals/Constitutional/EENT/Resp/CV/GI//MS/Neuro/Skin/Heme-Lymph-Imm. ASSESSMENT/PLAN:  1. Acute URI  Uncontrolled. Covering with abx given duration   Supportive care reviewed  Humidified air  Honey lemon tea  Nasal rinse prn  Flonase daily  NSAIDs/ tylenol for pain and fever  Mucinex prn for congestion   - azithromycin (ZITHROMAX) 250 MG tablet; Take 1 tablet by mouth See Admin Instructions for 5 days 500mg on day 1 followed by 250mg on days 2 - 5  Dispense: 6 tablet; Refill: 0    FU when due to see PCP for CC and prn     An  electronic signature was used to authenticate this note. --JARED Little - CNP on 3/28/2022 at 9:06 AM        Marrie Gaucher, was evaluated through a synchronous (real-time) audio-video encounter. The patient (or guardian if applicable) is aware that this is a billable service, which includes applicable co-pays. This Virtual Visit was conducted with patient's (and/or legal guardian's) consent. The visit was conducted pursuant to the emergency declaration under the [facility], [facility] authority and the [place] [employer] Act.   Patient identification was verified, and a caregiver was present

## 2022-08-23 ENCOUNTER — OFFICE VISIT (OUTPATIENT)
Dept: INTERNAL MEDICINE CLINIC | Age: 47
End: 2022-08-23
Payer: COMMERCIAL

## 2022-08-23 VITALS
OXYGEN SATURATION: 98 % | BODY MASS INDEX: 47.29 KG/M2 | SYSTOLIC BLOOD PRESSURE: 118 MMHG | DIASTOLIC BLOOD PRESSURE: 80 MMHG | WEIGHT: 293 LBS | HEART RATE: 74 BPM

## 2022-08-23 DIAGNOSIS — E55.9 VITAMIN D INSUFFICIENCY: ICD-10-CM

## 2022-08-23 DIAGNOSIS — Z00.00 ENCOUNTER FOR WELL ADULT EXAM WITHOUT ABNORMAL FINDINGS: ICD-10-CM

## 2022-08-23 DIAGNOSIS — I10 ESSENTIAL HYPERTENSION: ICD-10-CM

## 2022-08-23 DIAGNOSIS — Z12.11 COLON CANCER SCREENING: ICD-10-CM

## 2022-08-23 DIAGNOSIS — E66.01 CLASS 3 SEVERE OBESITY WITHOUT SERIOUS COMORBIDITY WITH BODY MASS INDEX (BMI) OF 50.0 TO 59.9 IN ADULT, UNSPECIFIED OBESITY TYPE (HCC): ICD-10-CM

## 2022-08-23 DIAGNOSIS — F41.9 ANXIETY: ICD-10-CM

## 2022-08-23 DIAGNOSIS — Z23 NEED FOR TDAP VACCINATION: ICD-10-CM

## 2022-08-23 DIAGNOSIS — E61.1 IRON DEFICIENCY: ICD-10-CM

## 2022-08-23 DIAGNOSIS — Z00.00 WELL ADULT EXAM: Primary | ICD-10-CM

## 2022-08-23 PROCEDURE — 90715 TDAP VACCINE 7 YRS/> IM: CPT | Performed by: INTERNAL MEDICINE

## 2022-08-23 PROCEDURE — 90471 IMMUNIZATION ADMIN: CPT | Performed by: INTERNAL MEDICINE

## 2022-08-23 PROCEDURE — 99396 PREV VISIT EST AGE 40-64: CPT | Performed by: INTERNAL MEDICINE

## 2022-08-23 RX ORDER — CITALOPRAM 20 MG/1
20 TABLET ORAL DAILY
Qty: 90 TABLET | Refills: 3 | Status: SHIPPED | OUTPATIENT
Start: 2022-08-23

## 2022-08-23 RX ORDER — LISINOPRIL 10 MG/1
10 TABLET ORAL DAILY
Qty: 90 TABLET | Refills: 3 | Status: SHIPPED | OUTPATIENT
Start: 2022-08-23

## 2022-08-23 RX ORDER — HYDROCHLOROTHIAZIDE 25 MG/1
25 TABLET ORAL DAILY
Qty: 90 TABLET | Refills: 3 | Status: SHIPPED | OUTPATIENT
Start: 2022-08-23

## 2022-08-23 RX ORDER — TIRZEPATIDE 2.5 MG/.5ML
2.5 INJECTION, SOLUTION SUBCUTANEOUS WEEKLY
Qty: 12 PEN | Refills: 1 | Status: SHIPPED | OUTPATIENT
Start: 2022-08-23 | End: 2022-09-25 | Stop reason: DRUGHIGH

## 2022-08-23 ASSESSMENT — ENCOUNTER SYMPTOMS
DIARRHEA: 0
CONSTIPATION: 0
SHORTNESS OF BREATH: 0
CHEST TIGHTNESS: 0

## 2022-08-23 NOTE — PATIENT INSTRUCTIONS
Mounjaro for weight loss. 2.5 mg injected weekly into your upper thigh or abdomen. Check on line for [place] coupon.

## 2022-08-23 NOTE — PROGRESS NOTES
Well Adult Note  Name: Augusta Jacobsen Date: 9/3/2022   MRN: [id number] Sex: Female   Age: 51 y.o. Ethnicity: Non- / Non    : 1975 Race: White (non-)      Alcides Muñoz is here for well adult exam.  History:    Here today for wellness visit. Has had a busy summer. Son graduated from [employer] and has started college. Did not require knee surgery in the spring- had Baker's Cyst and ortho popped the cyst and gave her a cortisone injection. Is starting to get more sore but has been on it a lot. Is very frustrated with her weight. Would like to take something for weight loss. Mammogram due in September. No previous colonoscopy. Pap 2021. Review of Systems   Constitutional:  Negative for appetite change, fatigue and fever. Respiratory:  Negative for chest tightness and shortness of breath. Cardiovascular:  Negative for chest pain. Gastrointestinal:  Negative for constipation and diarrhea. Skin:  Negative for rash. Allergies   Allergen Reactions    Buspar [Buspirone] Other (See Comments)     Numbness and tingling          Prior to Visit Medications    Medication Sig Taking?  Authorizing Provider   lisinopril (PRINIVIL;ZESTRIL) 10 MG tablet Take 1 tablet by mouth daily Yes Eric Barros DO   hydroCHLOROthiazide (HYDRODIURIL) 25 MG tablet Take 1 tablet by mouth daily Yes Eric Barros DO   citalopram (CELEXA) 20 MG tablet Take 1 tablet by mouth daily Yes Eric Barros DO   Tirzepatide [facility]) 2.5 MG/0.5ML SOPN SC injection Inject 0.5 mLs into the skin once a week Yes Eric Barros DO   Cetirizine HCl (ZYRTEC ALLERGY PO) Take by mouth Yes Historical Provider, MD   fluticasone (FLONASE) 50 MCG/ACT nasal spray 1 spray by Nasal route daily Yes Historical Provider, MD         Past Medical History:   Diagnosis Date    Anxiety     HTN (hypertension)     Vertigo        Past Surgical History:   Procedure Laterality Date  SECTION  ,         Family History   Problem Relation Age of Onset    Psoriasis Mother     Arthritis Mother         psoriatic    Hypertension Father        Social History     Tobacco Use    Smoking status: Never    Smokeless tobacco: Never   Substance Use Topics    Alcohol use: Yes     Comment: rarely    Drug use: No       Objective   /80   Pulse 74   Wt 293 lb (132.9 kg)   SpO2 98%   BMI 47.29 kg/m²   Wt Readings from Last 3 Encounters:   22 293 lb (132.9 kg)   22 267 lb 3.2 oz (121.2 kg)   21 260 lb (117.9 kg)     There were no vitals filed for this visit. Physical Exam  Vitals and nursing note reviewed. Constitutional:       Appearance: She is well-developed. She is not toxic-appearing. HENT:      Head: Normocephalic. Right Ear: Tympanic membrane, ear canal and external ear normal.      Left Ear: Tympanic membrane, ear canal and external ear normal.      Mouth/Throat:      Pharynx: No oropharyngeal exudate or posterior oropharyngeal erythema. Eyes:      General: No scleral icterus. Extraocular Movements: Extraocular movements intact. Conjunctiva/sclera: Conjunctivae normal.      Pupils: Pupils are equal, round, and reactive to light. Neck:      Thyroid: No thyroid mass or thyromegaly. Vascular: No carotid bruit. Cardiovascular:      Rate and Rhythm: Normal rate and regular rhythm. Heart sounds: Normal heart sounds. No murmur heard. Pulmonary:      Effort: Pulmonary effort is normal.      Breath sounds: Normal breath sounds. Musculoskeletal:      Right lower leg: No edema. Left lower leg: No edema. Lymphadenopathy:      Cervical: No cervical adenopathy. Neurological:      General: No focal deficit present. Mental Status: She is alert and oriented to person, place, and time. Psychiatric:         Mood and Affect: Mood normal.         Behavior: Behavior normal. Behavior is cooperative. Assessment   Plan   1. Well adult exam  Assessment & Plan:  Mammogram is due in September. Due for colon cancer screening-referral placed for colonoscopy. Follows with GYN for Pap smears, most recent Pap smear was in November 2021. Tdap updated today. Check fasting blood work. Orders:  -     citalopram (CELEXA) 20 MG tablet; Take 1 tablet by mouth daily, Disp-90 tablet, R-3Normal  -     TSH with Reflex; Future  -     Lipid Panel; Future  -     Comprehensive Metabolic Panel; Future  -     CBC with Auto Differential; Future  2. Essential hypertension  Assessment & Plan:  Well-controlled. Continue lisinopril 10 mg daily and hydrochlorothiazide 25 mg daily. Orders:  -     lisinopril (PRINIVIL;ZESTRIL) 10 MG tablet; Take 1 tablet by mouth daily, Disp-90 tablet, R-3Normal  -     hydroCHLOROthiazide (HYDRODIURIL) 25 MG tablet; Take 1 tablet by mouth daily, Disp-90 tablet, R-3Normal  3. Vitamin D insufficiency  Assessment & Plan:  Check vitamin D level. Orders:  -     Vitamin D 25 Hydroxy; Future  4. Iron deficiency  Assessment & Plan:   Check CBC, ferritin. Due for colonoscopy. 5. Class 3 severe obesity without serious comorbidity with body mass index (BMI) of 50.0 to 59.9 in adult, unspecified obesity type [facility])  Assessment & Plan:   Patient previously lost weight with Opdivo. Unfortunately, she has gained quite a bit of her weight back just following a normal diet. Discussed treatment options, including phentermine versus Topamax versus Ozempic/Trulicity versus Mounjaro. Lorre Alexander will likely be the most beneficial and is indicated for ongoing use for weight loss. Patient is agreeable and prescription provided for Mounjaro 2.5 mg weekly. Consider referral to [facility] for medical versus surgical weight loss. .  Discussed potential side effects for pancreatitis. Orders:  -     INSULIN FREE & TOTAL; Future  6. Colon cancer screening  Assessment & Plan:   Due for colon cancer screening.   Referral placed to GI for colonoscopy. Orders:  -     Olivia Leone MD, Gastroenterology, [facility]  7. Encounter for well adult exam without abnormal findings  8. Need for Tdap vaccination  -     Tdap, BOOSTRIX, (age 8 yrs+), IM  9. Anxiety  Assessment & Plan:  Doing well on citalopram 20 mg daily. Continue. Personalized Preventive Plan   Current Health Maintenance Status  Immunization History   Administered Date(s) Administered    COVID-19, PFIZER PURPLE top, DILUTE for use, (age 15 y+), 30mcg/0.3mL 02/04/2021, 02/25/2021    Td vaccine (adult) 01/01/1994    Tdap (Boostrix, Adacel) 08/23/2022        Health Maintenance   Topic Date Due    HIV screen  Never done    Hepatitis C screen  Never done    Colorectal Cancer Screen  Never done    COVID-19 Vaccine (3 - Booster for Pfizer series) 07/25/2021    Flu vaccine (1) Never done    Depression Screen  01/12/2023    Cervical cancer screen  11/24/2026    Lipids  08/27/2027    DTaP/Tdap/Td vaccine (2 - Td or Tdap) 08/23/2032    Hepatitis A vaccine  Aged Out    Hepatitis B vaccine  Aged Out    Hib vaccine  Aged Out    Meningococcal (ACWY) vaccine  Aged Out    Pneumococcal 0-64 years Vaccine  Aged Out     Recommendations for [employer] Due: see orders and patient instructions/AVS.    Return in about 5 weeks (around 9/27/2022) for [facility].

## 2022-08-25 ENCOUNTER — TELEPHONE (OUTPATIENT)
Dept: ADMINISTRATIVE | Age: 47
End: 2022-08-25

## 2022-08-25 NOTE — TELEPHONE ENCOUNTER
Submitted PA for [facility]  Via [facility]  Key: [id number] STATUS: PENDING

## 2022-08-29 LAB
A/G RATIO: 1.7 (CALC) (ref 1–2.5)
ALBUMIN SERPL-MCNC: 3.8 G/DL (ref 3.6–5.1)
ALP BLD-CCNC: 63 U/L (ref 31–125)
ALT SERPL-CCNC: 11 U/L (ref 6–29)
AST SERPL-CCNC: 15 U/L (ref 10–35)
BASOPHILS ABSOLUTE: 47 CELLS/UL (ref 0–200)
BASOPHILS RELATIVE PERCENT: 0.8 %
BILIRUB SERPL-MCNC: 1 MG/DL (ref 0.2–1.2)
BUN / CREAT RATIO: ABNORMAL (CALC) (ref 6–22)
BUN BLDV-MCNC: 15 MG/DL (ref 7–25)
CALCIUM SERPL-MCNC: 9.1 MG/DL (ref 8.6–10.2)
CHLORIDE BLD-SCNC: 102 MMOL/L (ref 98–110)
CHOLESTEROL, TOTAL: 178 MG/DL
CHOLESTEROL/HDL RATIO: 2 (CALC)
CO2: 29 MMOL/L (ref 20–32)
CREAT SERPL-MCNC: 0.61 MG/DL (ref 0.5–0.99)
EOSINOPHILS ABSOLUTE: 142 CELLS/UL (ref 15–500)
EOSINOPHILS RELATIVE PERCENT: 2.4 %
ESTIMATED GLOMERULAR FILTRATION RATE CREATININE EQUATION: 111 ML/MIN/1.73M2
GLOBULIN: 2.2 G/DL (CALC) (ref 1.9–3.7)
GLUCOSE BLD-MCNC: 74 MG/DL (ref 65–99)
HCT VFR BLD CALC: 39.2 % (ref 35–45)
HDLC SERPL-MCNC: 87 MG/DL
HEMOGLOBIN: 12.8 G/DL (ref 11.7–15.5)
INSULIN: 5.9 UIU/ML
LDL CHOLESTEROL CALCULATED: 77 MG/DL (CALC)
LYMPHOCYTES ABSOLUTE: 1333 CELLS/UL (ref 850–3900)
LYMPHOCYTES RELATIVE PERCENT: 22.6 %
MCH RBC QN AUTO: 30.6 PG (ref 27–33)
MCHC RBC AUTO-ENTMCNC: 32.7 G/DL (ref 32–36)
MCV RBC AUTO: 93.8 FL (ref 80–100)
MONOCYTES ABSOLUTE: 478 CELLS/UL (ref 200–950)
MONOCYTES RELATIVE PERCENT: 8.1 %
NEUTROPHILS ABSOLUTE: 3900 CELLS/UL (ref 1500–7800)
NONHDLC SERPL-MCNC: 91 MG/DL (CALC)
PDW BLD-RTO: 12.6 % (ref 11–15)
PLATELET # BLD: 281 THOUSAND/UL (ref 140–400)
PMV BLD AUTO: 9.6 FL (ref 7.5–12.5)
POTASSIUM SERPL-SCNC: 3.6 MMOL/L (ref 3.5–5.3)
RBC # BLD: 4.18 MILLION/UL (ref 3.8–5.1)
SEGMENTED NEUTROPHILS RELATIVE PERCENT: 66.1 %
SODIUM BLD-SCNC: 139 MMOL/L (ref 135–146)
TOTAL PROTEIN: 6 G/DL (ref 6.1–8.1)
TRIGL SERPL-MCNC: 67 MG/DL
TSH ULTRASENSITIVE: 2.27 MIU/L
VITAMIN D 25-HYDROXY: 44 NG/ML (ref 30–100)
WBC # BLD: 5.9 THOUSAND/UL (ref 3.8–10.8)

## 2022-09-03 PROBLEM — Z12.11 COLON CANCER SCREENING: Status: ACTIVE | Noted: 2022-09-03

## 2022-09-03 NOTE — ASSESSMENT & PLAN NOTE
Mammogram is due in September. Due for colon cancer screening-referral placed for colonoscopy. Follows with GYN for Pap smears, most recent Pap smear was in November 2021. Tdap updated today. Check fasting blood work.

## 2022-09-03 NOTE — ASSESSMENT & PLAN NOTE
Patient previously lost weight with Opdivo. Unfortunately, she has gained quite a bit of her weight back just following a normal diet. Discussed treatment options, including phentermine versus Topamax versus Ozempic/Trulicity versus Mounjaro. Bimal Roup will likely be the most beneficial and is indicated for ongoing use for weight loss. Patient is agreeable and prescription provided for Mounjaro 2.5 mg weekly. Consider referral to [facility] for medical versus surgical weight loss. .  Discussed potential side effects for pancreatitis.

## 2022-09-09 RX ORDER — AMOXICILLIN AND CLAVULANATE POTASSIUM 875; 125 MG/1; MG/1
1 TABLET, FILM COATED ORAL 2 TIMES DAILY
Qty: 20 TABLET | Refills: 0 | Status: SHIPPED | OUTPATIENT
Start: 2022-09-09 | End: 2022-09-19

## 2022-09-25 RX ORDER — TIRZEPATIDE 5 MG/.5ML
5 INJECTION, SOLUTION SUBCUTANEOUS WEEKLY
Qty: 4 ADJUSTABLE DOSE PRE-FILLED PEN SYRINGE | Refills: 5 | Status: SHIPPED | OUTPATIENT
Start: 2022-09-25 | End: 2022-10-23 | Stop reason: DRUGHIGH

## 2022-10-03 PROBLEM — Z12.11 COLON CANCER SCREENING: Status: RESOLVED | Noted: 2022-09-03 | Resolved: 2022-10-03

## 2022-10-03 PROBLEM — Z00.00 WELL ADULT EXAM: Status: RESOLVED | Noted: 2021-07-14 | Resolved: 2022-10-03

## 2022-10-20 ENCOUNTER — PATIENT MESSAGE (OUTPATIENT)
Dept: INTERNAL MEDICINE CLINIC | Age: 47
End: 2022-10-20

## 2022-10-23 RX ORDER — TIRZEPATIDE 7.5 MG/.5ML
7.5 INJECTION, SOLUTION SUBCUTANEOUS WEEKLY
Qty: 4 ADJUSTABLE DOSE PRE-FILLED PEN SYRINGE | Refills: 0 | Status: SHIPPED | OUTPATIENT
Start: 2022-10-23

## 2022-10-23 NOTE — TELEPHONE ENCOUNTER
From: Carl Ceja  To: Dr. Justen Patino: 10/20/2022 4:51 PM EDT  Subject: Ceferino Carpio,     Jia you are doing well! I had a question about the [facility] prescription. I take my last shot of the 5.0 tomorrow. My weight loss on the 5.0 mg shot didnt change much. I am currently down 14 pounds. I was wondering if I could bump up to the 7.5 mg for my next prescription refill? Thank you so much!     Kaylynn Ramos

## 2022-11-22 ENCOUNTER — OFFICE VISIT (OUTPATIENT)
Dept: INTERNAL MEDICINE CLINIC | Age: 47
End: 2022-11-22
Payer: COMMERCIAL

## 2022-11-22 VITALS
SYSTOLIC BLOOD PRESSURE: 108 MMHG | WEIGHT: 274.4 LBS | BODY MASS INDEX: 44.1 KG/M2 | HEART RATE: 74 BPM | HEIGHT: 66 IN | DIASTOLIC BLOOD PRESSURE: 74 MMHG

## 2022-11-22 DIAGNOSIS — E66.01 CLASS 3 SEVERE OBESITY WITHOUT SERIOUS COMORBIDITY WITH BODY MASS INDEX (BMI) OF 50.0 TO 59.9 IN ADULT, UNSPECIFIED OBESITY TYPE (HCC): Primary | ICD-10-CM

## 2022-11-22 PROCEDURE — 99213 OFFICE O/P EST LOW 20 MIN: CPT | Performed by: INTERNAL MEDICINE

## 2022-11-22 PROCEDURE — 3074F SYST BP LT 130 MM HG: CPT | Performed by: INTERNAL MEDICINE

## 2022-11-22 PROCEDURE — 3078F DIAST BP <80 MM HG: CPT | Performed by: INTERNAL MEDICINE

## 2022-11-22 RX ORDER — TIRZEPATIDE 7.5 MG/.5ML
7.5 INJECTION, SOLUTION SUBCUTANEOUS WEEKLY
Qty: 4 ADJUSTABLE DOSE PRE-FILLED PEN SYRINGE | Refills: 3 | Status: SHIPPED | OUTPATIENT
Start: 2022-11-22 | End: 2022-12-01 | Stop reason: RX

## 2022-11-22 NOTE — PROGRESS NOTES
Patient: Carl Ceja is a 49 y.o. female who presents today with the following Chief Complaint(s):  Chief Complaint   Patient presents with    Follow-up       HPI    Here today for follow up on weight. Has lost 19 pounds over the past 3 months. Has been on Mounjaro 7.5 mg weekly. Has only lost 3 pounds over the past month. Wondering about increasing to 10 mg daily. Has been watching carbs and sugars, is eating less. Is not snacking. No exercise.      Allergies   Allergen Reactions    Buspar [Buspirone] Other (See Comments)     Numbness and tingling       Past Medical History:   Diagnosis Date    Anxiety     HTN (hypertension)     Vertigo       Past Surgical History:   Procedure Laterality Date     SECTION  ,      Social History     Socioeconomic History    Marital status:      Spouse name: Not on file    Number of children: Not on file    Years of education: Not on file    Highest education level: Not on file   Occupational History    Occupation: teacher     Employer: [employer]   Tobacco Use    Smoking status: Never    Smokeless tobacco: Never   Substance and Sexual Activity    Alcohol use: Yes     Comment: rarely    Drug use: No    Sexual activity: Not on file   Other Topics Concern    Not on file   Social History Narrative    Not on file     Social Determinants of Health     Financial Resource Strain: Not on file   Food Insecurity: Not on file   Transportation Needs: Not on file   Physical Activity: Not on file   Stress: Not on file   Social Connections: Not on file   Intimate Partner Violence: Not on file   Housing Stability: Not on file     Family History   Problem Relation Age of Onset    Psoriasis Mother     Arthritis Mother         psoriatic    Hypertension Father         Outpatient Medications Prior to Visit   Medication Sig Dispense Refill    VITAMIN D, CHOLECALCIFEROL, PO Take by mouth      lisinopril (PRINIVIL;ZESTRIL) 10 MG tablet Take 1 tablet by mouth daily 90 tablet 3    hydroCHLOROthiazide (HYDRODIURIL) 25 MG tablet Take 1 tablet by mouth daily 90 tablet 3    citalopram (CELEXA) 20 MG tablet Take 1 tablet by mouth daily 90 tablet 3    Cetirizine HCl (ZYRTEC ALLERGY PO) Take by mouth      fluticasone (FLONASE) 50 MCG/ACT nasal spray 1 spray by Nasal route daily      Tirzepatide (MOUNJARO) 7.5 MG/0.5ML SOPN SC injection Inject 0.5 mLs into the skin once a week 4 Adjustable Dose Pre-filled Pen Syringe 0     No facility-administered medications prior to visit. Patient'spast medical history, surgical history, family history, medications,  and allergies  were all reviewed and updated as appropriate today. Review of Systems    /74   Pulse 74   Ht 5' 6\" (1.676 m)   Wt 274 lb 6.4 oz (124.5 kg)   BMI 44.29 kg/m²   Physical Exam  Vitals and nursing note reviewed. Constitutional:       Appearance: She is well-developed. She is not toxic-appearing. HENT:      Head: Normocephalic. Right Ear: Tympanic membrane, ear canal and external ear normal.      Left Ear: Tympanic membrane, ear canal and external ear normal.      Mouth/Throat:      Pharynx: No oropharyngeal exudate or posterior oropharyngeal erythema. Eyes:      General: No scleral icterus. Extraocular Movements: Extraocular movements intact. Conjunctiva/sclera: Conjunctivae normal.      Pupils: Pupils are equal, round, and reactive to light. Neck:      Thyroid: No thyroid mass or thyromegaly. Vascular: No carotid bruit. Cardiovascular:      Rate and Rhythm: Normal rate and regular rhythm. Heart sounds: Normal heart sounds. No murmur heard. Pulmonary:      Effort: Pulmonary effort is normal.      Breath sounds: Normal breath sounds. Musculoskeletal:      Right lower leg: No edema. Left lower leg: No edema. Lymphadenopathy:      Cervical: No cervical adenopathy. Neurological:      General: No focal deficit present.       Mental Status: She is alert and oriented to person, place, and time. Psychiatric:         Mood and Affect: Mood normal.         Behavior: Behavior normal. Behavior is cooperative. ASSESSMENT/PLAN:    Problem List Items Addressed This Visit       Class 3 severe obesity without serious comorbidity with body mass index (BMI) of 50.0 to 59.9 in adult [facility]) - Primary      Has done very well with Mounjaro, having lost 19 pounds over the past 3 months. She is frustrated as her weight loss is slower than she would like and she is only lost 3 pounds over the past month. She is questioning if her Mounjaro dose should be increased. Based on goal weight loss of approximately 1 pound per week, she is on target I do not recommend increasing dose at this time. Would prefer to reserve dose adjustments for when she does reach a weight plateau. Continue working on [employer]. Add exercise as able. Exercise is limited because of knee pain. Relevant Medications    Tirzepatide (MOUNJARO) 7.5 MG/0.5ML SOPN SC injection       Current Outpatient Medications   Medication Sig Dispense Refill    VITAMIN D, CHOLECALCIFEROL, PO Take by mouth      Tirzepatide (MOUNJARO) 7.5 MG/0.5ML SOPN SC injection Inject 0.5 mLs into the skin once a week 4 Adjustable Dose Pre-filled Pen Syringe 3    lisinopril (PRINIVIL;ZESTRIL) 10 MG tablet Take 1 tablet by mouth daily 90 tablet 3    hydroCHLOROthiazide (HYDRODIURIL) 25 MG tablet Take 1 tablet by mouth daily 90 tablet 3    citalopram (CELEXA) 20 MG tablet Take 1 tablet by mouth daily 90 tablet 3    Cetirizine HCl (ZYRTEC ALLERGY PO) Take by mouth      fluticasone (FLONASE) 50 MCG/ACT nasal spray 1 spray by Nasal route daily       No current facility-administered medications for this visit. Return in about 3 months (around 2/22/2023).

## 2022-11-28 NOTE — ASSESSMENT & PLAN NOTE
Has done very well with Mounjaro, having lost 19 pounds over the past 3 months. She is frustrated as her weight loss is slower than she would like and she is only lost 3 pounds over the past month. She is questioning if her Mounjaro dose should be increased. Based on goal weight loss of approximately 1 pound per week, she is on target I do not recommend increasing dose at this time. Would prefer to reserve dose adjustments for when she does reach a weight plateau. Continue working on [employer]. Add exercise as able. Exercise is limited because of knee pain.

## 2022-11-29 ENCOUNTER — PATIENT MESSAGE (OUTPATIENT)
Dept: INTERNAL MEDICINE CLINIC | Age: 47
End: 2022-11-29

## 2022-12-01 RX ORDER — TIRZEPATIDE 10 MG/.5ML
10 INJECTION, SOLUTION SUBCUTANEOUS WEEKLY
Qty: 4 ADJUSTABLE DOSE PRE-FILLED PEN SYRINGE | Refills: 5 | Status: SHIPPED | OUTPATIENT
Start: 2022-12-01

## 2022-12-01 NOTE — TELEPHONE ENCOUNTER
From: Anastasiia Springer  To: Dr. Julia Herman: 11/29/2022 4:16 PM EST  Subject: Nidia Huff,    I checked with [facility] today about the 7.5 Mounjaro dose because I havent been contacted to pick it up and I am now overdue. They said it was listed as out of stock. When the  asked the pharmacist he said there was not an expected date it would be in because of a National back order. Would it be possible to get the 10 dose? Or if you have any other idea to get the 7.5 dose? Thanks so much! Hope you had a great Thanksgiving!     Justo Velasco

## 2022-12-13 RX ORDER — AMOXICILLIN AND CLAVULANATE POTASSIUM 875; 125 MG/1; MG/1
1 TABLET, FILM COATED ORAL 2 TIMES DAILY
Qty: 20 TABLET | Refills: 0 | Status: SHIPPED | OUTPATIENT
Start: 2022-12-13 | End: 2022-12-23

## 2023-02-17 RX ORDER — TIRZEPATIDE 7.5 MG/.5ML
7.5 INJECTION, SOLUTION SUBCUTANEOUS WEEKLY
Qty: 4 ADJUSTABLE DOSE PRE-FILLED PEN SYRINGE | Refills: 3 | Status: SHIPPED | OUTPATIENT
Start: 2023-02-17

## 2023-02-22 ENCOUNTER — OFFICE VISIT (OUTPATIENT)
Dept: INTERNAL MEDICINE CLINIC | Age: 48
End: 2023-02-22

## 2023-02-22 VITALS
OXYGEN SATURATION: 97 % | HEIGHT: 66 IN | BODY MASS INDEX: 43.58 KG/M2 | WEIGHT: 271.2 LBS | SYSTOLIC BLOOD PRESSURE: 130 MMHG | HEART RATE: 87 BPM | DIASTOLIC BLOOD PRESSURE: 88 MMHG

## 2023-02-22 DIAGNOSIS — E55.9 VITAMIN D INSUFFICIENCY: ICD-10-CM

## 2023-02-22 DIAGNOSIS — Z00.00 WELL ADULT EXAM: ICD-10-CM

## 2023-02-22 DIAGNOSIS — I10 ESSENTIAL HYPERTENSION: ICD-10-CM

## 2023-02-22 DIAGNOSIS — E66.01 CLASS 3 SEVERE OBESITY WITHOUT SERIOUS COMORBIDITY WITH BODY MASS INDEX (BMI) OF 50.0 TO 59.9 IN ADULT, UNSPECIFIED OBESITY TYPE (HCC): Primary | ICD-10-CM

## 2023-02-22 DIAGNOSIS — E61.1 IRON DEFICIENCY: ICD-10-CM

## 2023-02-22 DIAGNOSIS — Z12.11 COLON CANCER SCREENING: ICD-10-CM

## 2023-02-22 RX ORDER — TIRZEPATIDE 10 MG/.5ML
10 INJECTION, SOLUTION SUBCUTANEOUS WEEKLY
Qty: 4 ADJUSTABLE DOSE PRE-FILLED PEN SYRINGE | Refills: 5 | Status: SHIPPED | OUTPATIENT
Start: 2023-02-22

## 2023-02-22 SDOH — ECONOMIC STABILITY: HOUSING INSECURITY
IN THE LAST 12 MONTHS, WAS THERE A TIME WHEN YOU DID NOT HAVE A STEADY PLACE TO SLEEP OR SLEPT IN A SHELTER (INCLUDING NOW)?: NO

## 2023-02-22 SDOH — ECONOMIC STABILITY: FOOD INSECURITY: WITHIN THE PAST 12 MONTHS, THE FOOD YOU BOUGHT JUST DIDN'T LAST AND YOU DIDN'T HAVE MONEY TO GET MORE.: NEVER TRUE

## 2023-02-22 SDOH — ECONOMIC STABILITY: FOOD INSECURITY: WITHIN THE PAST 12 MONTHS, YOU WORRIED THAT YOUR FOOD WOULD RUN OUT BEFORE YOU GOT MONEY TO BUY MORE.: NEVER TRUE

## 2023-02-22 SDOH — ECONOMIC STABILITY: INCOME INSECURITY: HOW HARD IS IT FOR YOU TO PAY FOR THE VERY BASICS LIKE FOOD, HOUSING, MEDICAL CARE, AND HEATING?: NOT HARD AT ALL

## 2023-02-22 ASSESSMENT — PATIENT HEALTH QUESTIONNAIRE - PHQ9
1. LITTLE INTEREST OR PLEASURE IN DOING THINGS: 0
SUM OF ALL RESPONSES TO PHQ QUESTIONS 1-9: 0
2. FEELING DOWN, DEPRESSED OR HOPELESS: 0
SUM OF ALL RESPONSES TO PHQ QUESTIONS 1-9: 0
SUM OF ALL RESPONSES TO PHQ9 QUESTIONS 1 & 2: 0

## 2023-02-22 NOTE — PROGRESS NOTES
Patient: Nolan Rice is a 49 y.o. female who presents today with the following Chief Complaint(s):  Chief Complaint   Patient presents with    3 Month Follow-Up       HPI    Here today for follow up. Has been losing weight with Mounjaro. Curbs her appetite. Has been able to lose weight whereas in the past she has not. Has gained 5 pounds in the past 2 weeks as she has not been able to get Mounjaro. Has not had any problems getting Mounjaro with her $25 coupon. Previously tried:  Elizabeth- lost 20-25 pounds over 8 months and then stalled. Keto- lost about 60 pounds, required a lot of supplements. Stalled. Still follows a low-carb diet. Calorie restriction- was able to lose 20-25 pounds and then stall. WW- slow weight loss, difficult to maintain. Unable to take phentermine d/t high blood pressure. Periods have been somewhat inconsistent but not newly so. No blood pressure issues. Doing well on lisinopril.     Allergies   Allergen Reactions    Buspar [Buspirone] Other (See Comments)     Numbness and tingling       Past Medical History:   Diagnosis Date    Anxiety     HTN (hypertension)     Vertigo       Past Surgical History:   Procedure Laterality Date     SECTION  ,      Social History     Socioeconomic History    Marital status:      Spouse name: Not on file    Number of children: Not on file    Years of education: Not on file    Highest education level: Not on file   Occupational History    Occupation: teacher     Employer: [employer]   Tobacco Use    Smoking status: Never    Smokeless tobacco: Never   Substance and Sexual Activity    Alcohol use: Yes     Comment: rarely    Drug use: No    Sexual activity: Not on file   Other Topics Concern    Not on file   Social History Narrative    Not on file     Social Determinants of Health     Financial Resource Strain: Low Risk     Difficulty of Paying Living Expenses: Not hard at all   Food Insecurity: No Food Insecurity    Worried About Running Out of Food in the Last Year: Never true    [address] in the Last Year: Never true   Transportation Needs: Unknown    Lack of Transportation (Medical): Not on file    Lack of Transportation (Non-Medical): No   Physical Activity: Not on file   Stress: Not on file   Social Connections: Not on file   Intimate Partner Violence: Not on file   Housing Stability: Unknown    Unable to Pay for Housing in the Last Year: Not on file    Number of Places Lived in the Last Year: Not on file    Unstable Housing in the Last Year: No     Family History   Problem Relation Age of Onset    Psoriasis Mother     Arthritis Mother         psoriatic    Hypertension Father         Outpatient Medications Prior to Visit   Medication Sig Dispense Refill    VITAMIN D, CHOLECALCIFEROL, PO Take by mouth      lisinopril (PRINIVIL;ZESTRIL) 10 MG tablet Take 1 tablet by mouth daily 90 tablet 3    hydroCHLOROthiazide (HYDRODIURIL) 25 MG tablet Take 1 tablet by mouth daily 90 tablet 3    citalopram (CELEXA) 20 MG tablet Take 1 tablet by mouth daily 90 tablet 3    Cetirizine HCl (ZYRTEC ALLERGY PO) Take by mouth      fluticasone (FLONASE) 50 MCG/ACT nasal spray 1 spray by Nasal route daily      Tirzepatide (MOUNJARO) 7.5 MG/0.5ML SOPN SC injection Inject 0.5 mLs into the skin once a week 4 Adjustable Dose Pre-filled Pen Syringe 3     No facility-administered medications prior to visit. Patient'spast medical history, surgical history, family history, medications,  and allergies  were all reviewed and updated as appropriate today. Review of Systems   Constitutional:  Negative for appetite change, fatigue and fever. Respiratory:  Negative for chest tightness and shortness of breath. Cardiovascular:  Negative for chest pain. Gastrointestinal:  Negative for constipation and diarrhea. Skin:  Negative for rash.      /88   Pulse 87   Ht 5' 6\" (1.676 m)   Wt 271 lb 3.2 oz (123 kg)   LMP 02/19/2023 (Exact Date)   SpO2 97%   BMI 43.77 kg/m²   Physical Exam  Vitals and nursing note reviewed. Constitutional:       Appearance: She is well-developed. She is not toxic-appearing. HENT:      Head: Normocephalic. Right Ear: Tympanic membrane, ear canal and external ear normal.      Left Ear: Tympanic membrane, ear canal and external ear normal.      Mouth/Throat:      Pharynx: No oropharyngeal exudate or posterior oropharyngeal erythema. Eyes:      General: No scleral icterus. Extraocular Movements: Extraocular movements intact. Conjunctiva/sclera: Conjunctivae normal.      Pupils: Pupils are equal, round, and reactive to light. Neck:      Thyroid: No thyroid mass or thyromegaly. Vascular: No carotid bruit. Cardiovascular:      Rate and Rhythm: Normal rate and regular rhythm. Heart sounds: Normal heart sounds. No murmur heard. Pulmonary:      Effort: Pulmonary effort is normal.      Breath sounds: Normal breath sounds. Musculoskeletal:      Right lower leg: No edema. Left lower leg: No edema. Lymphadenopathy:      Cervical: No cervical adenopathy. Neurological:      General: No focal deficit present. Mental Status: She is alert and oriented to person, place, and time. Psychiatric:         Mood and Affect: Mood normal.         Behavior: Behavior normal. Behavior is cooperative. ASSESSMENT/PLAN:    Problem List Items Addressed This Visit       Class 3 severe obesity without serious comorbidity with body mass index (BMI) of 50.0 to 59.9 in adult [facility]) - Primary     Has lost 22 pounds since starting [facility] in August 2022 with starting weight of 293 pounds. She has been able to successfully use her [facility] coupon at her [facility] pharmacy in [place]. Unfortunately, Bernardine Roses been out of stock for the past 2 weeks and she has gained 5 pounds since [facility] has been on hold. Resume Mounjaro 10 mg weekly once able.          Relevant Medications    Tirzepatide (MOUNJARO) 10 MG/0.5ML SOPN SC injection    Colon cancer screening      Referral placed to GI for colonoscopy. Relevant Orders    MELI Keating MD, Gastroenterology, [facility]    Essential hypertension      Well-controlled. Continue lisinopril 10 mg daily and hydrochlorothiazide 25 mg daily. Continue working on weight loss. Relevant Orders    Comprehensive Metabolic Panel    Lipid Panel    Iron deficiency      Check CBC, iron level. Relevant Orders    CBC with Auto Differential    Ferritin    Vitamin D insufficiency     Check vitamin D level. Other Visit Diagnoses       Well adult exam        Relevant Orders    Comprehensive Metabolic Panel    CBC with Auto Differential    Lipid Panel    TSH with Reflex    Vitamin D 25 Hydroxy            Current Outpatient Medications   Medication Sig Dispense Refill    Tirzepatide (MOUNJARO) 10 MG/0.5ML SOPN SC injection Inject 0.5 mLs into the skin once a week 4 Adjustable Dose Pre-filled Pen Syringe 5    VITAMIN D, CHOLECALCIFEROL, PO Take by mouth      lisinopril (PRINIVIL;ZESTRIL) 10 MG tablet Take 1 tablet by mouth daily 90 tablet 3    hydroCHLOROthiazide (HYDRODIURIL) 25 MG tablet Take 1 tablet by mouth daily 90 tablet 3    citalopram (CELEXA) 20 MG tablet Take 1 tablet by mouth daily 90 tablet 3    Cetirizine HCl (ZYRTEC ALLERGY PO) Take by mouth      fluticasone (FLONASE) 50 MCG/ACT nasal spray 1 spray by Nasal route daily       No current facility-administered medications for this visit. Return in about 5 months (around 7/22/2023).

## 2023-02-23 ENCOUNTER — TELEPHONE (OUTPATIENT)
Dept: ADMINISTRATIVE | Age: 48
End: 2023-02-23

## 2023-02-23 NOTE — TELEPHONE ENCOUNTER
PA submitted VIA CMM for  Mounjaro 10MG/0.5ML pen-injectors   (Joshua: Marcela Gandara) PENDING    Message from Plan  CaseId:[id number];Status:Denied; Review Type:Prior Auth

## 2023-02-24 ENCOUNTER — PATIENT MESSAGE (OUTPATIENT)
Dept: INTERNAL MEDICINE CLINIC | Age: 48
End: 2023-02-24

## 2023-02-24 NOTE — TELEPHONE ENCOUNTER
From: SHANTI SAMUEL  To: Denver Young  Sent: 2/24/2023 11:30 AM EST  Subject: Discuss Medications    [facility] are [facility]  [facility] for [facility].  You are still able to use the coupon and get it for $525.00

## 2023-02-24 NOTE — TELEPHONE ENCOUNTER
Spoke with Pharmacy and pt is aware that she can still use the coupon and get the medication for $25.00.

## 2023-02-26 ASSESSMENT — ENCOUNTER SYMPTOMS
CONSTIPATION: 0
DIARRHEA: 0
SHORTNESS OF BREATH: 0
CHEST TIGHTNESS: 0

## 2023-02-27 NOTE — ASSESSMENT & PLAN NOTE
Well-controlled. Continue lisinopril 10 mg daily and hydrochlorothiazide 25 mg daily. Continue working on weight loss.

## 2023-02-27 NOTE — ASSESSMENT & PLAN NOTE
Has lost 22 pounds since starting [facility] in August 2022 with starting weight of 293 pounds. She has been able to successfully use her [facility] coupon at her [facility] pharmacy in [facility]. Unfortunately, Nika Caos been out of stock for the past 2 weeks and she has gained 5 pounds since [facility] has been on hold. Resume Mounjaro 10 mg weekly once able.

## 2023-03-28 PROBLEM — Z12.11 COLON CANCER SCREENING: Status: RESOLVED | Noted: 2022-09-03 | Resolved: 2023-03-28

## 2023-04-05 ENCOUNTER — HOSPITAL ENCOUNTER (OUTPATIENT)
Dept: WOMENS IMAGING | Age: 48
Discharge: HOME | End: 2023-04-05
Payer: COMMERCIAL

## 2023-04-05 VITALS — WEIGHT: 258 LBS | BODY MASS INDEX: 41.46 KG/M2 | HEIGHT: 66 IN

## 2023-04-05 DIAGNOSIS — Z12.31 VISIT FOR SCREENING MAMMOGRAM: ICD-10-CM

## 2023-04-05 PROCEDURE — 77063 BREAST TOMOSYNTHESIS BI: CPT

## 2023-05-15 ENCOUNTER — ANESTHESIA (OUTPATIENT)
Dept: ENDOSCOPY | Age: 48
End: 2023-05-15
Payer: COMMERCIAL

## 2023-05-15 ENCOUNTER — HOSPITAL ENCOUNTER (OUTPATIENT)
Age: 48
Setting detail: OUTPATIENT SURGERY
Discharge: HOME | End: 2023-05-15
Attending: INTERNAL MEDICINE | Admitting: INTERNAL MEDICINE
Payer: COMMERCIAL

## 2023-05-15 ENCOUNTER — ANESTHESIA EVENT (OUTPATIENT)
Dept: ENDOSCOPY | Age: 48
End: 2023-05-15
Payer: COMMERCIAL

## 2023-05-15 VITALS
TEMPERATURE: 96.9 F | RESPIRATION RATE: 16 BRPM | HEIGHT: 64 IN | HEART RATE: 71 BPM | WEIGHT: 242 LBS | BODY MASS INDEX: 41.32 KG/M2 | SYSTOLIC BLOOD PRESSURE: 95 MMHG | DIASTOLIC BLOOD PRESSURE: 56 MMHG | OXYGEN SATURATION: 100 %

## 2023-05-15 LAB — HCG SERPL QL: NEGATIVE

## 2023-05-15 PROCEDURE — 7100000011 HC PHASE II RECOVERY - ADDTL 15 MIN: Performed by: INTERNAL MEDICINE

## 2023-05-15 PROCEDURE — 7100000010 HC PHASE II RECOVERY - FIRST 15 MIN: Performed by: INTERNAL MEDICINE

## 2023-05-15 PROCEDURE — 3700000000 HC ANESTHESIA ATTENDED CARE: Performed by: INTERNAL MEDICINE

## 2023-05-15 PROCEDURE — 2580000003 HC RX 258: Performed by: NURSE ANESTHETIST, CERTIFIED REGISTERED

## 2023-05-15 PROCEDURE — 36415 COLL VENOUS BLD VENIPUNCTURE: CPT

## 2023-05-15 PROCEDURE — 2580000003 HC RX 258: Performed by: ANESTHESIOLOGY

## 2023-05-15 PROCEDURE — 3700000001 HC ADD 15 MINUTES (ANESTHESIA): Performed by: INTERNAL MEDICINE

## 2023-05-15 PROCEDURE — 2500000003 HC RX 250 WO HCPCS: Performed by: NURSE ANESTHETIST, CERTIFIED REGISTERED

## 2023-05-15 PROCEDURE — 84703 CHORIONIC GONADOTROPIN ASSAY: CPT

## 2023-05-15 PROCEDURE — 6360000002 HC RX W HCPCS: Performed by: NURSE ANESTHETIST, CERTIFIED REGISTERED

## 2023-05-15 PROCEDURE — 3609027000 HC COLONOSCOPY: Performed by: INTERNAL MEDICINE

## 2023-05-15 PROCEDURE — 2709999900 HC NON-CHARGEABLE SUPPLY: Performed by: INTERNAL MEDICINE

## 2023-05-15 RX ORDER — PROPOFOL 10 MG/ML
INJECTION, EMULSION INTRAVENOUS PRN
Status: DISCONTINUED | OUTPATIENT
Start: 2023-05-15 | End: 2023-05-15 | Stop reason: SDUPTHER

## 2023-05-15 RX ORDER — SODIUM CHLORIDE 9 MG/ML
INJECTION, SOLUTION INTRAVENOUS CONTINUOUS PRN
Status: DISCONTINUED | OUTPATIENT
Start: 2023-05-15 | End: 2023-05-15 | Stop reason: SDUPTHER

## 2023-05-15 RX ORDER — SODIUM CHLORIDE 9 MG/ML
INJECTION, SOLUTION INTRAVENOUS CONTINUOUS
Status: DISCONTINUED | OUTPATIENT
Start: 2023-05-15 | End: 2023-05-15 | Stop reason: HOSPADM

## 2023-05-15 RX ORDER — LIDOCAINE HYDROCHLORIDE 20 MG/ML
INJECTION, SOLUTION EPIDURAL; INFILTRATION; INTRACAUDAL; PERINEURAL PRN
Status: DISCONTINUED | OUTPATIENT
Start: 2023-05-15 | End: 2023-05-15 | Stop reason: SDUPTHER

## 2023-05-15 RX ADMIN — SODIUM CHLORIDE: 9 INJECTION, SOLUTION INTRAVENOUS at 09:34

## 2023-05-15 RX ADMIN — PROPOFOL 50 MG: 10 INJECTION, EMULSION INTRAVENOUS at 09:53

## 2023-05-15 RX ADMIN — PROPOFOL 50 MG: 10 INJECTION, EMULSION INTRAVENOUS at 09:49

## 2023-05-15 RX ADMIN — PROPOFOL 50 MG: 10 INJECTION, EMULSION INTRAVENOUS at 09:46

## 2023-05-15 RX ADMIN — PROPOFOL 50 MG: 10 INJECTION, EMULSION INTRAVENOUS at 09:42

## 2023-05-15 RX ADMIN — LIDOCAINE HYDROCHLORIDE 100 MG: 20 INJECTION, SOLUTION EPIDURAL; INFILTRATION; INTRACAUDAL; PERINEURAL at 09:42

## 2023-05-15 RX ADMIN — SODIUM CHLORIDE: 9 INJECTION, SOLUTION INTRAVENOUS at 09:04

## 2023-05-15 ASSESSMENT — PAIN SCALES - GENERAL
PAINLEVEL_OUTOF10: 0

## 2023-05-15 ASSESSMENT — PAIN - FUNCTIONAL ASSESSMENT: PAIN_FUNCTIONAL_ASSESSMENT: NONE - DENIES PAIN

## 2023-05-15 NOTE — ANESTHESIA PRE PROCEDURE
Department of Anesthesiology  Preprocedure Note       Name:  Sudha Morgan   Age:  49 y.o.  :  1975                                          MRN:  [id number]         Date:  5/15/2023      Surgeon: Anthony Stover):  Rufus Fuentes MD    Procedure: Procedure(s):  COLONOSCOPY DIAGNOSTIC    Medications prior to admission:   Prior to Admission medications    Medication Sig Start Date End Date Taking? Authorizing Provider   [facility]) 10 MG/0.5ML SOPN SC injection Inject 0.5 mLs into the skin once a week 23   Connor Noland,    VITAMIN D, CHOLECALCIFEROL, PO Take by mouth    Historical Provider, MD   lisinopril (PRINIVIL;ZESTRIL) 10 MG tablet Take 1 tablet by mouth daily 22   Connor Noland DO   hydroCHLOROthiazide (HYDRODIURIL) 25 MG tablet Take 1 tablet by mouth daily 22   Connor Noland DO   citalopram (CELEXA) 20 MG tablet Take 1 tablet by mouth daily 22   Connor Noland DO   Cetirizine HCl (ZYRTEC ALLERGY PO) Take by mouth    Historical Provider, MD   fluticasone (FLONASE) 50 MCG/ACT nasal spray 1 spray by Nasal route daily    Historical Provider, MD       Current medications:    Current Facility-Administered Medications   Medication Dose Route Frequency Provider Last Rate Last Admin    0.9 % sodium chloride infusion   IntraVENous Continuous Megan Cho MD           Allergies:     Allergies   Allergen Reactions    Buspar [Buspirone] Other (See Comments)     Numbness and tingling        Problem List:    Patient Active Problem List   Diagnosis Code    Essential hypertension I10    Anxiety F41.9    Benign paroxysmal positional vertigo of left ear H81.12    Vitamin D insufficiency E55.9    Iron deficiency E61.1    Class 3 severe obesity without serious comorbidity with body mass index (BMI) of 50.0 to 59.9 in adult ([facility] [address] E66.01, Z68.43    Acute pain of right knee M25.561       Past Medical History:        Diagnosis Date    Anxiety     HTN (hypertension)    

## 2023-05-15 NOTE — PROGRESS NOTES
Reviewed pt problem list, history, H&P and assessment preoperatively. Scope verified using 2 person system. Family in waiting room.     Electronically signed by Romain Vivar RN on 5/15/2023 at 9:39 AM

## 2023-05-15 NOTE — H&P
Gastroenterology Note             Pre-operative History and Physical    Patient: Aris Corona  : 1975  CSN:     History Obtained From:  patient and/or guardian. HISTORY OF PRESENT ILLNESS:    The patient is a 49 y.o. female  here for/colonoscopy. This a very pleasant 54-year-old female comes in for her first colonoscopy today currently she is not having any problem such as rectal bleeding change in bowel habits or unexplained abdominal pain    Past Medical History:    Past Medical History:   Diagnosis Date    Anxiety     HTN (hypertension)     Vertigo      Past Surgical History:    Past Surgical History:   Procedure Laterality Date     SECTION  ,     Medications Prior to Admission:   No current facility-administered medications on file prior to encounter.      Current Outpatient Medications on File Prior to Encounter   Medication Sig Dispense Refill    Cyanocobalamin (B-12 PO) Take by mouth daily      Tirzepatide (MOUNJARO) 10 MG/0.5ML SOPN SC injection Inject 0.5 mLs into the skin once a week (Patient taking differently: Inject 0.5 mLs into the skin once a week For weight loss) 4 Adjustable Dose Pre-filled Pen Syringe 5    VITAMIN D, CHOLECALCIFEROL, PO Take by mouth daily      lisinopril (PRINIVIL;ZESTRIL) 10 MG tablet Take 1 tablet by mouth daily 90 tablet 3    hydroCHLOROthiazide (HYDRODIURIL) 25 MG tablet Take 1 tablet by mouth daily 90 tablet 3    citalopram (CELEXA) 20 MG tablet Take 1 tablet by mouth daily 90 tablet 3    Cetirizine HCl (ZYRTEC ALLERGY PO) Take by mouth      fluticasone (FLONASE) 50 MCG/ACT nasal spray 1 spray by Nasal route daily          Allergies:  Buspar [buspirone]      Social History:   Social History     Tobacco Use    Smoking status: Never    Smokeless tobacco: Never   Substance Use Topics    Alcohol use: Yes     Comment: rarely     Family History:   Family History   Problem Relation Age of Onset    Psoriasis Mother     Arthritis Mother

## 2023-05-15 NOTE — DISCHARGE INSTRUCTIONS
Impression: Normal colonoscopy with some early diverticulosis     Recommendations: At this time her preparation was excellent we got a beautiful look at the colon therefore she does not need another colonoscopy for 10 years     You for this very kind referral this morning     Alecia Benavides MD, MD   [address]  5/15/2023    [address] may experience some lightheadedness for the next several hours. Plan on quiet relaxation for the rest of today. Nap for four hours following procedure if possible. A responsible adult needs to stay with you today. Eat bland food and avoid anything greasy or spicy initially-progress to your normal diet gradually. Diet restrictions as instructed. You may resume home medications as instructed. It is not unusual to experience some mild cramping or gas pains, and you may not have a bowel movement for several days. If you had a polyp removed, avoid strenuous activity for 48 hours. Avoid the use of aspirin or related compounds for one week, unless otherwise instructed by your physician. You may notice a small amount of blood in your next few bowel movements, but if a large amount passes, call your physician. If you have any of the following problems, notify your physician or return to the hospital emergency room : fever, chills, excessive bleeding, excessive vomiting, difficulty swallowing, uncontrolled pain, increased abdominal distention, shortness of breath or any other problems. Call your doctor at [phone] if you have any concerns. If you had any biopsies or polyps call for results in 5-7 business days. See your physician's report for details about your procedure and recommendations. ANESTHESIA DISCHARGE INSTRUCTIONS    Wear your seatbelt home.     You are under the influence of drugs-do not drink alcohol, drive ,operate machinery,or make any important decisions or sign any legal documentsfor 24

## 2023-05-15 NOTE — ANESTHESIA POSTPROCEDURE EVALUATION
Department of Anesthesiology  Postprocedure Note    Patient: Constantino Watkins  MRN: [id number]  YOB: 1975  Date of evaluation: 5/15/2023      Procedure Summary     Date: 05/15/23 Room / Location: [address] [place] [address]    Anesthesia Start: 2083 Anesthesia Stop: 5498    Procedure: COLONOSCOPY DIAGNOSTIC Diagnosis:       Colon cancer screening      (Colon cancer screening [Z12.11])    Surgeons: Jameson Thayer MD Responsible Provider: Harjeet Quintero MD    Anesthesia Type: MAC ASA Status: 2          Anesthesia Type: No value filed.     Stephanie Phase I: Stephanie Score: 10    Stephanie Phase II:        Anesthesia Post Evaluation    Patient location during evaluation: PACU  Patient participation: complete - patient participated  Level of consciousness: awake  Airway patency: patent  Nausea & Vomiting: no nausea and no vomiting  Complications: no  Cardiovascular status: blood pressure returned to baseline  Respiratory status: acceptable  Hydration status: stable

## 2023-05-15 NOTE — PROCEDURES
Colonoscopy Procedure  Note          Patient: Anthony Ronquillo  : 1975  CRN:  @[email or web address]@    Procedure: Colonoscopy     Date:  5/15/2023    Surgeon:  Nicole Sauceda MD, MD    Referring Physician:  Rachna Low DO    Preoperative Diagnosis:  Colon cancer screening [Z12.11]    Postoperative Diagnosis: Normal colonoscopy to the cecum    Anesthesia:  MAC    EBL: Minimal to none. Indications: This is a 49y.o. year old female who comes in today for her first colonoscopy currently she is not having any problems of rectal bleeding change in bowel habits or unexplained abdominal pain and she has no family history of colon cancer    Procedure: An informed consent was obtained from the patient after explanation of indications, benefits, possible risks and complications of the procedure. The patient was then taken to the endoscopy suite, placed in the left lateral decubitus position, and the above IV anesthesia was administered. Digital rectal examination was performed. No masses good rectal tone      Rectum normal on 4 view retroflexed view she does have some small internal hemorrhoids    Sigmoid there is early diverticulosis but no other problem    Descending normal    Transverse normal    Ascending normal    Cecum normal    TI not intubated    Preparation was excellent      The patient tolerated the procedure well and was taken to the PACU in good condition. There were no immediate complications. Impression: Normal colonoscopy with some early diverticulosis    Recommendations: At this time her preparation was excellent we got a beautiful look at the colon therefore she does not need another colonoscopy for 10 years    You for this very kind referral this morning    Nicole Sauceda MD, MD   [facility]  5/15/2023      Please note that some or all of this record was generated using voice recognition software.  If there are any questions about the content of this document, please contact the author as

## 2023-05-26 ENCOUNTER — PATIENT MESSAGE (OUTPATIENT)
Dept: INTERNAL MEDICINE CLINIC | Age: 48
End: 2023-05-26

## 2023-05-26 RX ORDER — TIRZEPATIDE 12.5 MG/.5ML
12.5 INJECTION, SOLUTION SUBCUTANEOUS WEEKLY
Qty: 12 ADJUSTABLE DOSE PRE-FILLED PEN SYRINGE | Refills: 1 | Status: SHIPPED | OUTPATIENT
Start: 2023-05-26

## 2023-06-09 RX ORDER — TIRZEPATIDE 10 MG/.5ML
10 INJECTION, SOLUTION SUBCUTANEOUS WEEKLY
Qty: 12 ADJUSTABLE DOSE PRE-FILLED PEN SYRINGE | Refills: 1 | Status: SHIPPED | OUTPATIENT
Start: 2023-06-09

## 2023-07-12 DIAGNOSIS — I10 ESSENTIAL HYPERTENSION: ICD-10-CM

## 2023-07-12 DIAGNOSIS — E61.1 IRON DEFICIENCY: ICD-10-CM

## 2023-07-12 DIAGNOSIS — Z00.00 WELL ADULT EXAM: ICD-10-CM

## 2023-07-12 LAB
25(OH)D3 SERPL-MCNC: 57.7 NG/ML
ALBUMIN SERPL-MCNC: 4.4 G/DL (ref 3.4–5)
ALBUMIN/GLOB SERPL: 2 {RATIO} (ref 1.1–2.2)
ALP SERPL-CCNC: 71 U/L (ref 40–129)
ALT SERPL-CCNC: 7 U/L (ref 10–40)
ANION GAP SERPL CALCULATED.3IONS-SCNC: 14 MMOL/L (ref 3–16)
AST SERPL-CCNC: 13 U/L (ref 15–37)
BASOPHILS # BLD: 0.1 K/UL (ref 0–0.2)
BASOPHILS NFR BLD: 1.2 %
BILIRUB SERPL-MCNC: 0.8 MG/DL (ref 0–1)
BUN SERPL-MCNC: 12 MG/DL (ref 7–20)
CALCIUM SERPL-MCNC: 9.5 MG/DL (ref 8.3–10.6)
CHLORIDE SERPL-SCNC: 101 MMOL/L (ref 99–110)
CHOLEST SERPL-MCNC: 201 MG/DL (ref 0–199)
CO2 SERPL-SCNC: 25 MMOL/L (ref 21–32)
CREAT SERPL-MCNC: 0.6 MG/DL (ref 0.6–1.1)
DEPRECATED RDW RBC AUTO: 13.4 % (ref 12.4–15.4)
EOSINOPHIL # BLD: 0.1 K/UL (ref 0–0.6)
EOSINOPHIL NFR BLD: 1.1 %
FERRITIN SERPL IA-MCNC: 55.9 NG/ML (ref 15–150)
GFR SERPLBLD CREATININE-BSD FMLA CKD-EPI: >60 ML/MIN/{1.73_M2}
GLUCOSE SERPL-MCNC: 83 MG/DL (ref 70–99)
HCT VFR BLD AUTO: 39.6 % (ref 36–48)
HDLC SERPL-MCNC: 75 MG/DL (ref 40–60)
HGB BLD-MCNC: 13.7 G/DL (ref 12–16)
LDLC SERPL CALC-MCNC: 110 MG/DL
LYMPHOCYTES # BLD: 1.5 K/UL (ref 1–5.1)
LYMPHOCYTES NFR BLD: 29.6 %
MCH RBC QN AUTO: 30.9 PG (ref 26–34)
MCHC RBC AUTO-ENTMCNC: 34.7 G/DL (ref 31–36)
MCV RBC AUTO: 89 FL (ref 80–100)
MONOCYTES # BLD: 0.4 K/UL (ref 0–1.3)
MONOCYTES NFR BLD: 7.1 %
NEUTROPHILS # BLD: 3.2 K/UL (ref 1.7–7.7)
NEUTROPHILS NFR BLD: 61 %
PLATELET # BLD AUTO: 299 K/UL (ref 135–450)
PMV BLD AUTO: 8.4 FL (ref 5–10.5)
POTASSIUM SERPL-SCNC: 3.5 MMOL/L (ref 3.5–5.1)
PROT SERPL-MCNC: 6.6 G/DL (ref 6.4–8.2)
RBC # BLD AUTO: 4.45 M/UL (ref 4–5.2)
SODIUM SERPL-SCNC: 140 MMOL/L (ref 136–145)
TRIGL SERPL-MCNC: 82 MG/DL (ref 0–150)
TSH SERPL DL<=0.005 MIU/L-ACNC: 1.48 UIU/ML (ref 0.27–4.2)
VLDLC SERPL CALC-MCNC: 16 MG/DL
WBC # BLD AUTO: 5.2 K/UL (ref 4–11)

## 2023-07-19 ENCOUNTER — OFFICE VISIT (OUTPATIENT)
Dept: INTERNAL MEDICINE CLINIC | Age: 48
End: 2023-07-19
Payer: COMMERCIAL

## 2023-07-19 VITALS
OXYGEN SATURATION: 99 % | SYSTOLIC BLOOD PRESSURE: 120 MMHG | DIASTOLIC BLOOD PRESSURE: 70 MMHG | HEIGHT: 64 IN | HEART RATE: 84 BPM | WEIGHT: 243.2 LBS | BODY MASS INDEX: 41.52 KG/M2

## 2023-07-19 DIAGNOSIS — I10 ESSENTIAL HYPERTENSION: ICD-10-CM

## 2023-07-19 DIAGNOSIS — F41.9 ANXIETY: ICD-10-CM

## 2023-07-19 DIAGNOSIS — E61.1 IRON DEFICIENCY: ICD-10-CM

## 2023-07-19 DIAGNOSIS — E55.9 VITAMIN D INSUFFICIENCY: ICD-10-CM

## 2023-07-19 DIAGNOSIS — E66.01 CLASS 3 SEVERE OBESITY WITHOUT SERIOUS COMORBIDITY WITH BODY MASS INDEX (BMI) OF 50.0 TO 59.9 IN ADULT, UNSPECIFIED OBESITY TYPE (HCC): Primary | ICD-10-CM

## 2023-07-19 PROCEDURE — 3074F SYST BP LT 130 MM HG: CPT | Performed by: INTERNAL MEDICINE

## 2023-07-19 PROCEDURE — 3078F DIAST BP <80 MM HG: CPT | Performed by: INTERNAL MEDICINE

## 2023-07-19 PROCEDURE — 99214 OFFICE O/P EST MOD 30 MIN: CPT | Performed by: INTERNAL MEDICINE

## 2023-07-19 ASSESSMENT — ENCOUNTER SYMPTOMS
CHEST TIGHTNESS: 0
DIARRHEA: 0
CONSTIPATION: 0
SHORTNESS OF BREATH: 0

## 2023-07-19 NOTE — PROGRESS NOTES
Patient: Sheri Tomlinson is a 49 y.o. female who presents today with the following Chief Complaint(s):  Chief Complaint   Patient presents with    Follow-up     5 month follow up       HPI    Here today for follow up. Doing very well on Mounjaro. Taking 12.5 mg weekly. Has lost 50 pounds since starting [facility] in August (starting weight 293 8/23/22). Does have nausea and fatigue the first few days after her injection. Did have colonoscopy in May. Was normal. Repeat in 10 years. HTN- doing well on lisinopril. Knees feeling better with weight loss. Walking for exercise.      Anxiety is doing well on citalopram.     Results for orders placed or performed in visit on 07/12/23   Ferritin   Result Value Ref Range    Ferritin 55.9 15.0 - 150.0 ng/mL   Vitamin D 25 Hydroxy   Result Value Ref Range    Vit D, 25-Hydroxy 57.7 >=30 ng/mL   TSH with Reflex   Result Value Ref Range    TSH 1.48 0.27 - 4.20 uIU/mL   Lipid Panel   Result Value Ref Range    Cholesterol, Total 201 (H) 0 - 199 mg/dL    Triglycerides 82 0 - 150 mg/dL    HDL 75 (H) 40 - 60 mg/dL    LDL Calculated 110 (H) <100 mg/dL    VLDL Cholesterol Calculated 16 Not Established mg/dL   CBC with Auto Differential   Result Value Ref Range    WBC 5.2 4.0 - 11.0 K/uL    RBC 4.45 4.00 - 5.20 M/uL    Hemoglobin 13.7 12.0 - 16.0 g/dL    Hematocrit 39.6 36.0 - 48.0 %    MCV 89.0 80.0 - 100.0 fL    MCH 30.9 26.0 - 34.0 pg    MCHC 34.7 31.0 - 36.0 g/dL    RDW 13.4 12.4 - 15.4 %    Platelets 694 713 - 359 K/uL    MPV 8.4 5.0 - 10.5 fL    Neutrophils % 61.0 %    Lymphocytes % 29.6 %    Monocytes % 7.1 %    Eosinophils % 1.1 %    Basophils % 1.2 %    Neutrophils Absolute 3.2 1.7 - 7.7 K/uL    Lymphocytes Absolute 1.5 1.0 - 5.1 K/uL    Monocytes Absolute 0.4 0.0 - 1.3 K/uL    Eosinophils Absolute 0.1 0.0 - 0.6 K/uL    Basophils Absolute 0.1 0.0 - 0.2 K/uL   Comprehensive Metabolic Panel   Result Value Ref Range    Sodium 140 136 - 145 mmol/L    Potassium 3.5 3.5 -

## 2023-07-23 PROBLEM — H81.12 BENIGN PAROXYSMAL POSITIONAL VERTIGO OF LEFT EAR: Status: RESOLVED | Noted: 2018-06-26 | Resolved: 2023-07-23

## 2023-07-23 PROBLEM — M25.561 ACUTE PAIN OF RIGHT KNEE: Status: RESOLVED | Noted: 2022-01-16 | Resolved: 2023-07-23

## 2023-08-07 ENCOUNTER — TELEPHONE (OUTPATIENT)
Dept: INTERNAL MEDICINE CLINIC | Age: 48
End: 2023-08-07

## 2023-08-08 ENCOUNTER — PATIENT MESSAGE (OUTPATIENT)
Dept: INTERNAL MEDICINE CLINIC | Age: 48
End: 2023-08-08

## 2023-08-08 NOTE — TELEPHONE ENCOUNTER
Submitted PA for [facility] 12. 5MG/0.5ML pen-injectors  Via CMM Key: [id number] STATUS: PENDING. Follow up done daily; if no response in three days we will refax for status check. If another three days goes by with no response we will call the insurance for status.

## 2023-08-09 NOTE — TELEPHONE ENCOUNTER
Received DENIAL for Mounjaro 12. 5MG/0.5ML pen-injectors; denial letter attached. If this requires a response please respond to the pool ( P MHCX [address]). Thank you please advise patient.

## 2023-08-14 RX ORDER — SEMAGLUTIDE 1 MG/.5ML
1 INJECTION, SOLUTION SUBCUTANEOUS
Qty: 2 ML | Refills: 5 | Status: SHIPPED | OUTPATIENT
Start: 2023-08-14

## 2023-08-14 NOTE — TELEPHONE ENCOUNTER
Wegovy 1 mg weekly sent to [facility]. This is likely a similar dose to what she was taking for [facility]. There have been some availability issues with [facility] so she may have some difficulty getting it. Similar to Catracho Callahan is a weekly injection, may cause nausea, diarrhea, and/or constipation. Pancreatitis is possible but unlikely.

## 2023-08-22 ENCOUNTER — PATIENT MESSAGE (OUTPATIENT)
Dept: INTERNAL MEDICINE CLINIC | Age: 48
End: 2023-08-22

## 2023-08-24 RX ORDER — SEMAGLUTIDE 1 MG/.5ML
1 INJECTION, SOLUTION SUBCUTANEOUS
Qty: 2 ML | Refills: 5 | Status: SHIPPED | OUTPATIENT
Start: 2023-08-24

## 2023-09-03 ENCOUNTER — PATIENT MESSAGE (OUTPATIENT)
Dept: INTERNAL MEDICINE CLINIC | Age: 48
End: 2023-09-03

## 2023-09-05 RX ORDER — TIRZEPATIDE 12.5 MG/.5ML
12.5 INJECTION, SOLUTION SUBCUTANEOUS WEEKLY
Qty: 4 ADJUSTABLE DOSE PRE-FILLED PEN SYRINGE | Refills: 5 | Status: SHIPPED | OUTPATIENT
Start: 2023-09-05

## 2023-09-06 ENCOUNTER — TELEMEDICINE (OUTPATIENT)
Dept: INTERNAL MEDICINE CLINIC | Age: 48
End: 2023-09-06
Payer: COMMERCIAL

## 2023-09-06 DIAGNOSIS — E66.01 CLASS 3 SEVERE OBESITY WITHOUT SERIOUS COMORBIDITY WITH BODY MASS INDEX (BMI) OF 50.0 TO 59.9 IN ADULT, UNSPECIFIED OBESITY TYPE (HCC): Primary | ICD-10-CM

## 2023-09-06 PROCEDURE — 99214 OFFICE O/P EST MOD 30 MIN: CPT | Performed by: INTERNAL MEDICINE

## 2023-09-06 NOTE — PROGRESS NOTES
person/place/time [x] Able to follow commands    [] Abnormal -     Eyes:   EOM    [x]  Normal    [] Abnormal -   Sclera  [x]  Normal    [] Abnormal -          Discharge [x]  None visible   [] Abnormal -     HENT: [x] Normocephalic, atraumatic  [] Abnormal -   [x] Mouth/Throat: Mucous membranes are moist    External Ears [x] Normal  [] Abnormal -    Neck: [x] No visualized mass [] Abnormal -     Pulmonary/Chest: [x] Respiratory effort normal   [x] No visualized signs of difficulty breathing or respiratory distress        [] Abnormal -      Musculoskeletal:   [x] Normal gait with no signs of ataxia         [x] Normal range of motion of neck        [] Abnormal -     Neurological:        [x] No Facial Asymmetry (Cranial nerve 7 motor function) (limited exam due to video visit)          [x] No gaze palsy        [] Abnormal -          Skin:        [x] No significant exanthematous lesions or discoloration noted on facial skin         [] Abnormal -            Psychiatric:       [x] Normal Affect [] Abnormal -        [x] No Hallucinations    Other pertinent observable physical exam findings:-         On this date 9/6/2023 I have spent 41 minutes reviewing previous notes, test results and face to face (virtual) with the patient discussing the diagnosis and importance of compliance with the treatment plan as well as documenting on the day of the visit.    --DO Evita

## 2023-09-06 NOTE — ASSESSMENT & PLAN NOTE
No evidence of metabolic syndrome past or present. Discussed treatment options with patient including Mounjaro, GLP-1's (Ozempic/Wegovy, Trulicity, Rybelsus), phentermine, Adipex, Wellbutrin, surgical weight loss. Insurance does not cover ANYTHING to assist with weight loss despite obvious health benefits of weight loss. Mounjaro out of pocket is likely not feasible long-term. Discussed availability of semaglutide through [employer] for $200/month as least expensive option. Is agreeable to compounded semaglutide. Start 1 mg weekly x 4 weeks then increase to 1.75 mg weekly. Follow up in 4 weeks.

## 2023-09-10 DIAGNOSIS — Z00.00 WELL ADULT EXAM: ICD-10-CM

## 2023-09-11 RX ORDER — CITALOPRAM 20 MG/1
20 TABLET ORAL DAILY
Qty: 90 TABLET | Refills: 3 | Status: SHIPPED | OUTPATIENT
Start: 2023-09-11

## 2023-09-15 ENCOUNTER — TELEPHONE (OUTPATIENT)
Dept: INTERNAL MEDICINE CLINIC | Age: 48
End: 2023-09-15

## 2023-09-15 NOTE — TELEPHONE ENCOUNTER
Did they receive a completed form last week or earlier this week? I fully completed a form for her last week and it should have been faxed WITH directions. That is what they should use.

## 2023-09-15 NOTE — TELEPHONE ENCOUNTER
[facility] calling about the Seimaglutide  directions--said there isn't any---please call them at [phone] option 3  Thanks

## 2023-10-29 ENCOUNTER — PATIENT MESSAGE (OUTPATIENT)
Dept: INTERNAL MEDICINE CLINIC | Age: 48
End: 2023-10-29

## 2023-10-30 DIAGNOSIS — I10 ESSENTIAL HYPERTENSION: ICD-10-CM

## 2023-10-31 RX ORDER — LISINOPRIL 10 MG/1
10 TABLET ORAL DAILY
Qty: 90 TABLET | Refills: 1 | Status: SHIPPED | OUTPATIENT
Start: 2023-10-31

## 2023-10-31 RX ORDER — HYDROCHLOROTHIAZIDE 25 MG/1
25 TABLET ORAL DAILY
Qty: 90 TABLET | Refills: 1 | Status: SHIPPED | OUTPATIENT
Start: 2023-10-31

## 2023-11-10 ENCOUNTER — OFFICE VISIT (OUTPATIENT)
Dept: INTERNAL MEDICINE CLINIC | Age: 48
End: 2023-11-10
Payer: COMMERCIAL

## 2023-11-10 VITALS — WEIGHT: 250 LBS | HEART RATE: 90 BPM | OXYGEN SATURATION: 96 % | BODY MASS INDEX: 42.68 KG/M2 | HEIGHT: 64 IN

## 2023-11-10 DIAGNOSIS — I10 ESSENTIAL HYPERTENSION: ICD-10-CM

## 2023-11-10 DIAGNOSIS — E66.01 CLASS 3 SEVERE OBESITY WITHOUT SERIOUS COMORBIDITY WITH BODY MASS INDEX (BMI) OF 50.0 TO 59.9 IN ADULT, UNSPECIFIED OBESITY TYPE (HCC): Primary | ICD-10-CM

## 2023-11-10 PROCEDURE — 99214 OFFICE O/P EST MOD 30 MIN: CPT | Performed by: INTERNAL MEDICINE

## 2023-11-10 NOTE — PROGRESS NOTES
(2/22/2023)    Hunger Vital Sign     Worried About Running Out of Food in the Last Year: Never true     [address] in the Last Year: Never true   Transportation Needs: Unknown (2/22/2023)    PRAPARE - Transportation     Lack of Transportation (Medical): Not on file     Lack of Transportation (Non-Medical): No   Physical Activity: Not on file   Stress: Not on file   Social Connections: Not on file   Intimate Partner Violence: Not on file   Housing Stability: Unknown (2/22/2023)    Housing Stability Vital Sign     Unable to Pay for Housing in the Last Year: Not on file     Number of Places Lived in the Last Year: Not on file     Unstable Housing in the Last Year: No     Family History   Problem Relation Age of Onset    Psoriasis Mother     Arthritis Mother         psoriatic    Hypertension Father         Outpatient Medications Prior to Visit   Medication Sig Dispense Refill    hydroCHLOROthiazide (HYDRODIURIL) 25 MG tablet TAKE 1 TABLET DAILY 90 tablet 1    lisinopril (PRINIVIL;ZESTRIL) 10 MG tablet TAKE 1 TABLET DAILY 90 tablet 1    citalopram (CELEXA) 20 MG tablet TAKE 1 TABLET DAILY 90 tablet 3    Tirzepatide (MOUNJARO) 12.5 MG/0.5ML SOPN SC injection Inject 0.5 mLs into the skin once a week 4 Adjustable Dose Pre-filled Pen Syringe 5    Cyanocobalamin (B-12 PO) Take by mouth daily      VITAMIN D, CHOLECALCIFEROL, PO Take by mouth daily      Cetirizine HCl (ZYRTEC ALLERGY PO) Take by mouth      fluticasone (FLONASE) 50 MCG/ACT nasal spray 1 spray by Nasal route daily As needed      Semaglutide-Weight Management (WEGOVY) 1 MG/0.5ML SOAJ SC injection Inject 1 mg into the skin every 7 days for 28 days 2 mL 0    Semaglutide-Weight Management (WEGOVY) 1.7 MG/0.75ML SOAJ SC injection Inject 1.7 mg into the skin every 7 days (Patient not taking: Reported on 11/10/2023) 3 mL 2     No facility-administered medications prior to visit.        Patient'spast medical history, surgical history, family history, medications,

## 2024-01-10 ENCOUNTER — PATIENT MESSAGE (OUTPATIENT)
Dept: INTERNAL MEDICINE CLINIC | Age: 49
End: 2024-01-10

## 2024-01-10 DIAGNOSIS — E66.01 CLASS 3 SEVERE OBESITY WITHOUT SERIOUS COMORBIDITY WITH BODY MASS INDEX (BMI) OF 50.0 TO 59.9 IN ADULT, UNSPECIFIED OBESITY TYPE (HCC): Primary | ICD-10-CM

## 2024-01-12 ENCOUNTER — PATIENT MESSAGE (OUTPATIENT)
Dept: INTERNAL MEDICINE CLINIC | Age: 49
End: 2024-01-12

## 2024-01-12 NOTE — TELEPHONE ENCOUNTER
From: Cora Tyler  To: Dr. Ora Patel  Sent: 1/12/2024 7:24 AM EST  Subject: Zepbound    Good Morning,     I just wanted to check in with you since I rescheduled my appointment from this past Monday. With my new insurance starting, would you be willing to send in a Zepbound prescription for me? I was thinking the 10.0 dose?     I have downloaded the new savings card for the Zepbound, so I am hoping I can get it at a discounted price. Could you send it to the [facility] in [place]?     Thank you.  Cora Tyler

## 2024-01-12 NOTE — TELEPHONE ENCOUNTER
From: Cora Tyler  To: Dr. Ora Patel  Sent: 1/10/2024 8:21 AM EST  Subject: Zepbound    Good Morning,    I just wanted to check in with you since I rescheduled my appointment on Monday. With my new insurance starting, would you be willing to send in a Zepbound prescription for me? I was thinking about the 10.0 dose.    I have downloaded the new savings card for the Zepbound, so I am hoping I can get it at a discounted price. Could you send it to the [facility] in [place]?    Thank you.  Cora Tyler

## 2024-01-27 ASSESSMENT — PATIENT HEALTH QUESTIONNAIRE - PHQ9
1. LITTLE INTEREST OR PLEASURE IN DOING THINGS: 0
SUM OF ALL RESPONSES TO PHQ QUESTIONS 1-9: 0
SUM OF ALL RESPONSES TO PHQ QUESTIONS 1-9: 0
SUM OF ALL RESPONSES TO PHQ9 QUESTIONS 1 & 2: 0
SUM OF ALL RESPONSES TO PHQ QUESTIONS 1-9: 0
2. FEELING DOWN, DEPRESSED OR HOPELESS: NOT AT ALL
SUM OF ALL RESPONSES TO PHQ QUESTIONS 1-9: 0
1. LITTLE INTEREST OR PLEASURE IN DOING THINGS: NOT AT ALL
2. FEELING DOWN, DEPRESSED OR HOPELESS: 0
SUM OF ALL RESPONSES TO PHQ9 QUESTIONS 1 & 2: 0

## 2024-01-28 NOTE — PROGRESS NOTES
Patient: Cora Tyler is a 48 y.o. female who presents today with the following Chief Complaint(s):  Chief Complaint   Patient presents with    Annual Exam     No other concerns       HPI    Here today for wellness visit.     Has recently resumed Mounjaro 10 mg weekly for weight loss. Has started losing weight again. Did gain 24 pounds from July until she was able to resume Mounjaro. Does not feel great the day after her injection- has nausea and a slight HA.   Lost 50 pounds in the first 11 months of taking Mounjaro.     Exercise- has not really tried. Does notice that her knee feels better with less weight. Does not use weights.     Mammogram due in April    Colonoscopy 5/15/23- normal. Repeat in 10 years.     Pap- overdue. Does follow with gyn cut does not recall whom.     Water- at least 60 oz/day.     Caffeine- 1 powder pack of flavored caffeinated water/day. Occasional Coke Zero.     Alcohol- twice/month.     Tobacco/vape- none.   Results for orders placed or performed in visit on 07/12/23   Ferritin   Result Value Ref Range    Ferritin 55.9 15.0 - 150.0 ng/mL   Vitamin D 25 Hydroxy   Result Value Ref Range    Vit D, 25-Hydroxy 57.7 >=30 ng/mL   TSH with Reflex   Result Value Ref Range    TSH 1.48 0.27 - 4.20 uIU/mL   Lipid Panel   Result Value Ref Range    Cholesterol, Total 201 (H) 0 - 199 mg/dL    Triglycerides 82 0 - 150 mg/dL    HDL 75 (H) 40 - 60 mg/dL    LDL Calculated 110 (H) <100 mg/dL    VLDL Cholesterol Calculated 16 Not Established mg/dL   CBC with Auto Differential   Result Value Ref Range    WBC 5.2 4.0 - 11.0 K/uL    RBC 4.45 4.00 - 5.20 M/uL    Hemoglobin 13.7 12.0 - 16.0 g/dL    Hematocrit 39.6 36.0 - 48.0 %    MCV 89.0 80.0 - 100.0 fL    MCH 30.9 26.0 - 34.0 pg    MCHC 34.7 31.0 - 36.0 g/dL    RDW 13.4 12.4 - 15.4 %    Platelets 299 135 - 450 K/uL    MPV 8.4 5.0 - 10.5 fL    Neutrophils % 61.0 %    Lymphocytes % 29.6 %    Monocytes % 7.1 %    Eosinophils % 1.1 %    Basophils % 1.2 %

## 2024-01-29 ENCOUNTER — OFFICE VISIT (OUTPATIENT)
Dept: INTERNAL MEDICINE CLINIC | Age: 49
End: 2024-01-29
Payer: COMMERCIAL

## 2024-01-29 VITALS
HEIGHT: 64 IN | HEART RATE: 74 BPM | SYSTOLIC BLOOD PRESSURE: 110 MMHG | DIASTOLIC BLOOD PRESSURE: 78 MMHG | WEIGHT: 255.4 LBS | OXYGEN SATURATION: 98 % | BODY MASS INDEX: 43.6 KG/M2

## 2024-01-29 DIAGNOSIS — F41.9 ANXIETY: ICD-10-CM

## 2024-01-29 DIAGNOSIS — Z00.00 WELL ADULT EXAM: Primary | ICD-10-CM

## 2024-01-29 DIAGNOSIS — E66.01 CLASS 3 SEVERE OBESITY WITHOUT SERIOUS COMORBIDITY WITH BODY MASS INDEX (BMI) OF 50.0 TO 59.9 IN ADULT, UNSPECIFIED OBESITY TYPE (HCC): ICD-10-CM

## 2024-01-29 DIAGNOSIS — R73.9 HYPERGLYCEMIA: ICD-10-CM

## 2024-01-29 DIAGNOSIS — E61.1 IRON DEFICIENCY: ICD-10-CM

## 2024-01-29 DIAGNOSIS — E78.5 MILD HYPERLIPIDEMIA: ICD-10-CM

## 2024-01-29 DIAGNOSIS — I10 ESSENTIAL HYPERTENSION: ICD-10-CM

## 2024-01-29 DIAGNOSIS — E55.9 VITAMIN D INSUFFICIENCY: ICD-10-CM

## 2024-01-29 PROCEDURE — 3074F SYST BP LT 130 MM HG: CPT | Performed by: INTERNAL MEDICINE

## 2024-01-29 PROCEDURE — 99396 PREV VISIT EST AGE 40-64: CPT | Performed by: INTERNAL MEDICINE

## 2024-01-29 PROCEDURE — 3078F DIAST BP <80 MM HG: CPT | Performed by: INTERNAL MEDICINE

## 2024-01-29 PROCEDURE — G8484 FLU IMMUNIZE NO ADMIN: HCPCS | Performed by: INTERNAL MEDICINE

## 2024-01-29 NOTE — PATIENT INSTRUCTIONS
Francheska Joseph MD (gynecologist)  [address], [place] [address]  Phone: [phone]

## 2024-01-30 NOTE — ASSESSMENT & PLAN NOTE
Most recent mammogram was in April 2023.  Underwent colonoscopy in May 2023 that was normal with repeat due in 10 years.  Overdue for Pap smear-recommend patient follow-up with Dr. Joseph for GYN care.  Up-to-date on vaccines.  Fasting blood work ordered.  Continue working on weight loss.  Patient encouraged to increase exercise and add strength training.

## 2024-01-30 NOTE — ASSESSMENT & PLAN NOTE
Blood pressure is well controlled with lisinopril 10 mg daily and hydrochlorothiazide 25 mg daily.  Will monitor blood pressure as with weight loss she may be able to decrease blood pressure medication or stop altogether.

## 2024-01-30 NOTE — ASSESSMENT & PLAN NOTE
Patient is back on Mounjaro 10 mg weekly which is working very well for her and is covered by her insurance.  She has lost about 12 pounds since resuming Mounjaro 2 weeks ago.  Congratulated on her efforts.

## 2024-02-01 ENCOUNTER — PATIENT MESSAGE (OUTPATIENT)
Dept: INTERNAL MEDICINE CLINIC | Age: 49
End: 2024-02-01

## 2024-02-06 PROBLEM — Z00.00 WELL ADULT EXAM: Status: RESOLVED | Noted: 2021-07-14 | Resolved: 2024-02-06

## 2024-02-07 ENCOUNTER — TELEPHONE (OUTPATIENT)
Dept: ADMINISTRATIVE | Age: 49
End: 2024-02-07

## 2024-02-07 NOTE — TELEPHONE ENCOUNTER
Submitted PA for Mounjaro 10MG/0.5ML pen-injectors  Via CM (Key: [id number]) STATUS: PENDING.    Follow up done daily; if no decision with in three days we will refax.  If another three days goes by with no decision will call the insurance for status.

## 2024-02-08 NOTE — TELEPHONE ENCOUNTER
The medication was DENIED; DENIAL letter uploaded to MEDIA.    Coverage is provided for the treatment of type 2 diabetes mellitus.    If you want an APPEAL; please note in this encounter what new information you would like to APPEAL with.  Once complete route back to PA POOL.    If this requires a response please respond to the pool ( P MHCX PSC MEDICATION PRE-AUTH).      Thank you please advise patient

## 2024-02-09 DIAGNOSIS — E66.01 CLASS 3 SEVERE OBESITY WITHOUT SERIOUS COMORBIDITY WITH BODY MASS INDEX (BMI) OF 50.0 TO 59.9 IN ADULT, UNSPECIFIED OBESITY TYPE (HCC): Primary | ICD-10-CM

## 2024-02-09 DIAGNOSIS — E55.9 VITAMIN D INSUFFICIENCY: ICD-10-CM

## 2024-02-09 DIAGNOSIS — I10 ESSENTIAL HYPERTENSION: ICD-10-CM

## 2024-02-09 RX ORDER — TIRZEPATIDE 10 MG/.5ML
10 INJECTION, SOLUTION SUBCUTANEOUS WEEKLY
Qty: 2 ML | Refills: 3 | Status: SHIPPED | OUTPATIENT
Start: 2024-02-09

## 2024-02-12 NOTE — TELEPHONE ENCOUNTER
From: Cora Tyler  To: Dr. Ora Patel  Sent: 2/1/2024 9:02 PM EST  Subject: Mounjaro/Zepbound    Hi Dr. Patel,    Well, unfortunately, the insurance coverage was too good to be true. I received a letter in the mail from [employer]. It stated that I will need a coverage review for prior authorization or a trial of a plan-preferred alternative because of a step therapy requirement.     The insurance website stated that you could request a coverage review through [employer]. Could you possibly do that for me? I am assuming this is like a prior authorization?    I looked up Zepbound, and it stated that it wasn't covered at all under [employer]. If I can't get the Mounjaro to go through, I guess I will try the Zepbound and use the savings card to pay the reduced price.     Please let me know if there are other steps I need to be taking at this time. Thanks so much for all your help!    Cora Tyler

## 2024-02-13 ENCOUNTER — TELEPHONE (OUTPATIENT)
Dept: ADMINISTRATIVE | Age: 49
End: 2024-02-13

## 2024-02-13 NOTE — TELEPHONE ENCOUNTER
Submitted PA for Zepbound 10MG/0.5ML pen-injectors  Via CMM (Key: [id number]) STATUS: PENDING.    Message from [employer]: Drug is not covered by plan. This medication may be excluded from the patient's benefit.     Follow up done daily; if no decision with in three days we will refax.  If another three days goes by with no decision will call the insurance for status.

## 2024-03-11 DIAGNOSIS — Z00.00 WELL ADULT EXAM: ICD-10-CM

## 2024-03-12 RX ORDER — CITALOPRAM 20 MG/1
20 TABLET ORAL DAILY
Qty: 90 TABLET | Refills: 3 | OUTPATIENT
Start: 2024-03-12

## 2024-03-12 NOTE — TELEPHONE ENCOUNTER
Last OV: 1/29/2024  Next OV: 7/30/2024    Next appointment due: 7/29/2024    Last fill: 9/11/2023  Refills:   3

## 2024-04-11 ENCOUNTER — PATIENT MESSAGE (OUTPATIENT)
Dept: INTERNAL MEDICINE CLINIC | Age: 49
End: 2024-04-11

## 2024-04-12 NOTE — TELEPHONE ENCOUNTER
From: Cora Tyler  To: Dr. Ora Patel  Sent: 4/11/2024 6:13 AM EDT  Subject: Zepbound    Hi Dr. Patel,    Hope you are doing well! I have one shot left in my month’s supply of Zepbound. I have been at a stall in my weight loss the last three weeks. So, I was wondering if I could bump up to the 12.5 dose?     If so, I have been using the pharmacy at [facility] in [place], if you could send it there. Thanks so much and have a great day!    Cora Tyler

## 2024-04-15 ENCOUNTER — TELEPHONE (OUTPATIENT)
Dept: ADMINISTRATIVE | Age: 49
End: 2024-04-15

## 2024-04-15 NOTE — TELEPHONE ENCOUNTER
Received a PA request for Mounjaro 12.5mg. previous script was denied due to patient not having a diagnosis of Type 2 diabetes Cristy. This would not be approved.     If this requires a response please respond to the pool ( P MHCX PSC MEDICATION PRE-AUTH).      Thank you

## 2024-04-16 DIAGNOSIS — E66.01 CLASS 3 SEVERE OBESITY WITHOUT SERIOUS COMORBIDITY WITH BODY MASS INDEX (BMI) OF 50.0 TO 59.9 IN ADULT, UNSPECIFIED OBESITY TYPE (HCC): Primary | ICD-10-CM

## 2024-04-26 DIAGNOSIS — I10 ESSENTIAL HYPERTENSION: ICD-10-CM

## 2024-04-26 RX ORDER — HYDROCHLOROTHIAZIDE 25 MG/1
25 TABLET ORAL DAILY
Qty: 90 TABLET | Refills: 1 | Status: SHIPPED | OUTPATIENT
Start: 2024-04-26

## 2024-05-13 DIAGNOSIS — I10 ESSENTIAL HYPERTENSION: ICD-10-CM

## 2024-05-14 RX ORDER — LISINOPRIL 10 MG/1
10 TABLET ORAL DAILY
Qty: 90 TABLET | Refills: 0 | Status: SHIPPED | OUTPATIENT
Start: 2024-05-14

## 2024-05-14 NOTE — TELEPHONE ENCOUNTER
Last OV: 1/29/2024  Next OV: 7/30/2024    Next appointment due:around 7/29/2024     Last fill:10/31/23  Refills:1#90

## 2024-07-18 ENCOUNTER — HOSPITAL ENCOUNTER (OUTPATIENT)
Age: 49
Discharge: HOME | End: 2024-07-18
Payer: COMMERCIAL

## 2024-07-18 ENCOUNTER — HOSPITAL ENCOUNTER (OUTPATIENT)
Dept: WOMENS IMAGING | Age: 49
Discharge: HOME | End: 2024-07-18
Payer: COMMERCIAL

## 2024-07-18 VITALS — BODY MASS INDEX: 40.8 KG/M2 | WEIGHT: 239 LBS | HEIGHT: 64 IN

## 2024-07-18 DIAGNOSIS — R73.9 HYPERGLYCEMIA: ICD-10-CM

## 2024-07-18 DIAGNOSIS — E78.5 MILD HYPERLIPIDEMIA: ICD-10-CM

## 2024-07-18 DIAGNOSIS — E61.1 IRON DEFICIENCY: ICD-10-CM

## 2024-07-18 DIAGNOSIS — E55.9 VITAMIN D INSUFFICIENCY: ICD-10-CM

## 2024-07-18 DIAGNOSIS — Z12.31 VISIT FOR SCREENING MAMMOGRAM: ICD-10-CM

## 2024-07-18 DIAGNOSIS — Z00.00 WELL ADULT EXAM: ICD-10-CM

## 2024-07-18 LAB
25(OH)D3 SERPL-MCNC: 63.5 NG/ML
ALBUMIN SERPL-MCNC: 4.3 G/DL (ref 3.4–5)
ALBUMIN/GLOB SERPL: 1.3 {RATIO} (ref 1.1–2.2)
ALP SERPL-CCNC: 65 U/L (ref 40–129)
ALT SERPL-CCNC: 9 U/L (ref 10–40)
ANION GAP SERPL CALCULATED.3IONS-SCNC: 14 MMOL/L (ref 3–16)
AST SERPL-CCNC: 15 U/L (ref 15–37)
BASOPHILS # BLD: 0.1 K/UL (ref 0–0.2)
BASOPHILS NFR BLD: 1.5 %
BILIRUB SERPL-MCNC: 1.4 MG/DL (ref 0–1)
BUN SERPL-MCNC: 16 MG/DL (ref 7–20)
CALCIUM SERPL-MCNC: 9.5 MG/DL (ref 8.3–10.6)
CHLORIDE SERPL-SCNC: 99 MMOL/L (ref 99–110)
CHOLEST SERPL-MCNC: 202 MG/DL (ref 0–199)
CO2 SERPL-SCNC: 24 MMOL/L (ref 21–32)
CREAT SERPL-MCNC: 0.7 MG/DL (ref 0.6–1.1)
DEPRECATED RDW RBC AUTO: 13.4 % (ref 12.4–15.4)
EOSINOPHIL # BLD: 0.1 K/UL (ref 0–0.6)
EOSINOPHIL NFR BLD: 1.6 %
EST. AVERAGE GLUCOSE BLD GHB EST-MCNC: 91.1 MG/DL
FERRITIN SERPL IA-MCNC: 52.5 NG/ML (ref 15–150)
GFR SERPLBLD CREATININE-BSD FMLA CKD-EPI: >90 ML/MIN/{1.73_M2}
GLUCOSE SERPL-MCNC: 79 MG/DL (ref 70–99)
HBA1C MFR BLD: 4.8 %
HCT VFR BLD AUTO: 44.9 % (ref 36–48)
HDLC SERPL-MCNC: 69 MG/DL (ref 40–60)
HGB BLD-MCNC: 14.9 G/DL (ref 12–16)
LDLC SERPL CALC-MCNC: 117 MG/DL
LYMPHOCYTES # BLD: 1.6 K/UL (ref 1–5.1)
LYMPHOCYTES NFR BLD: 24.2 %
MCH RBC QN AUTO: 30.9 PG (ref 26–34)
MCHC RBC AUTO-ENTMCNC: 33.2 G/DL (ref 31–36)
MCV RBC AUTO: 93 FL (ref 80–100)
MONOCYTES # BLD: 0.5 K/UL (ref 0–1.3)
MONOCYTES NFR BLD: 7.3 %
NEUTROPHILS # BLD: 4.4 K/UL (ref 1.7–7.7)
NEUTROPHILS NFR BLD: 65.4 %
PLATELET # BLD AUTO: 351 K/UL (ref 135–450)
PMV BLD AUTO: 8.7 FL (ref 5–10.5)
POTASSIUM SERPL-SCNC: 4 MMOL/L (ref 3.5–5.1)
PROT SERPL-MCNC: 7.6 G/DL (ref 6.4–8.2)
RBC # BLD AUTO: 4.83 M/UL (ref 4–5.2)
SODIUM SERPL-SCNC: 137 MMOL/L (ref 136–145)
TRIGL SERPL-MCNC: 82 MG/DL (ref 0–150)
VLDLC SERPL CALC-MCNC: 16 MG/DL
WBC # BLD AUTO: 6.7 K/UL (ref 4–11)

## 2024-07-18 PROCEDURE — 83036 HEMOGLOBIN GLYCOSYLATED A1C: CPT

## 2024-07-18 PROCEDURE — 82728 ASSAY OF FERRITIN: CPT

## 2024-07-18 PROCEDURE — 80061 LIPID PANEL: CPT

## 2024-07-18 PROCEDURE — 85025 COMPLETE CBC W/AUTO DIFF WBC: CPT

## 2024-07-18 PROCEDURE — 80053 COMPREHEN METABOLIC PANEL: CPT

## 2024-07-18 PROCEDURE — 36415 COLL VENOUS BLD VENIPUNCTURE: CPT

## 2024-07-18 PROCEDURE — 77063 BREAST TOMOSYNTHESIS BI: CPT

## 2024-07-18 PROCEDURE — 82306 VITAMIN D 25 HYDROXY: CPT

## 2024-07-27 SDOH — ECONOMIC STABILITY: FOOD INSECURITY: WITHIN THE PAST 12 MONTHS, THE FOOD YOU BOUGHT JUST DIDN'T LAST AND YOU DIDN'T HAVE MONEY TO GET MORE.: NEVER TRUE

## 2024-07-27 SDOH — ECONOMIC STABILITY: TRANSPORTATION INSECURITY
IN THE PAST 12 MONTHS, HAS LACK OF TRANSPORTATION KEPT YOU FROM MEETINGS, WORK, OR FROM GETTING THINGS NEEDED FOR DAILY LIVING?: NO

## 2024-07-27 SDOH — ECONOMIC STABILITY: INCOME INSECURITY: HOW HARD IS IT FOR YOU TO PAY FOR THE VERY BASICS LIKE FOOD, HOUSING, MEDICAL CARE, AND HEATING?: NOT VERY HARD

## 2024-07-27 SDOH — ECONOMIC STABILITY: FOOD INSECURITY: WITHIN THE PAST 12 MONTHS, YOU WORRIED THAT YOUR FOOD WOULD RUN OUT BEFORE YOU GOT MONEY TO BUY MORE.: NEVER TRUE

## 2024-07-30 ENCOUNTER — OFFICE VISIT (OUTPATIENT)
Dept: INTERNAL MEDICINE CLINIC | Age: 49
End: 2024-07-30
Payer: COMMERCIAL

## 2024-07-30 VITALS
HEART RATE: 76 BPM | WEIGHT: 241.4 LBS | DIASTOLIC BLOOD PRESSURE: 78 MMHG | BODY MASS INDEX: 41.44 KG/M2 | SYSTOLIC BLOOD PRESSURE: 98 MMHG | OXYGEN SATURATION: 99 %

## 2024-07-30 DIAGNOSIS — R17 SERUM TOTAL BILIRUBIN ELEVATED: Primary | ICD-10-CM

## 2024-07-30 DIAGNOSIS — F41.9 ANXIETY: ICD-10-CM

## 2024-07-30 DIAGNOSIS — R73.9 HYPERGLYCEMIA: ICD-10-CM

## 2024-07-30 DIAGNOSIS — I10 ESSENTIAL HYPERTENSION: ICD-10-CM

## 2024-07-30 DIAGNOSIS — E66.01 CLASS 3 SEVERE OBESITY WITHOUT SERIOUS COMORBIDITY WITH BODY MASS INDEX (BMI) OF 50.0 TO 59.9 IN ADULT, UNSPECIFIED OBESITY TYPE (HCC): ICD-10-CM

## 2024-07-30 DIAGNOSIS — E55.9 VITAMIN D INSUFFICIENCY: ICD-10-CM

## 2024-07-30 PROCEDURE — G8427 DOCREV CUR MEDS BY ELIG CLIN: HCPCS | Performed by: INTERNAL MEDICINE

## 2024-07-30 PROCEDURE — 3078F DIAST BP <80 MM HG: CPT | Performed by: INTERNAL MEDICINE

## 2024-07-30 PROCEDURE — 99214 OFFICE O/P EST MOD 30 MIN: CPT | Performed by: INTERNAL MEDICINE

## 2024-07-30 PROCEDURE — 3074F SYST BP LT 130 MM HG: CPT | Performed by: INTERNAL MEDICINE

## 2024-07-30 PROCEDURE — 1036F TOBACCO NON-USER: CPT | Performed by: INTERNAL MEDICINE

## 2024-07-30 PROCEDURE — G8417 CALC BMI ABV UP PARAM F/U: HCPCS | Performed by: INTERNAL MEDICINE

## 2024-07-30 RX ORDER — HYDROCHLOROTHIAZIDE 25 MG/1
25 TABLET ORAL DAILY PRN
Qty: 90 TABLET | Refills: 1 | Status: SHIPPED
Start: 2024-07-30

## 2024-07-30 NOTE — PROGRESS NOTES
Patient: Cora Tyler is a 48 y.o. female who presents today with the following Chief Complaint(s):  Chief Complaint   Patient presents with    6 Month Follow-Up       HPI    Here today for follow up.    Started going to [employer] and was started on several supplements.     Continues to take Zepbound- has had some difficulty getting it filled. Was out of Zepbound for several weeks (May to June) and did gain some weight back but is losing again. No issues with nausea or constipation. Doing well. Does not feel like dose needs to be adjusted.     Exercise is still limited by knee pain.   - thinks that she is going to need to have knee surgery and will plan to have done over Thanksgiving.     HTN- has been feeling tired lately.     Citalopram is helpful.     Colonoscopy May 2023. normal.   Mammogram normal July.   Results for orders placed or performed during the hospital encounter of 07/18/24   Ferritin   Result Value Ref Range    Ferritin 52.5 15.0 - 150.0 ng/mL   Vitamin D 25 Hydroxy   Result Value Ref Range    Vit D, 25-Hydroxy 63.5 >=30 ng/mL   Lipid Panel   Result Value Ref Range    Cholesterol, Total 202 (H) 0 - 199 mg/dL    Triglycerides 82 0 - 150 mg/dL    HDL 69 (H) 40 - 60 mg/dL    LDL Cholesterol 117 (H) <100 mg/dL    VLDL Cholesterol Calculated 16 Not Established mg/dL   Comprehensive Metabolic Panel   Result Value Ref Range    Sodium 137 136 - 145 mmol/L    Potassium 4.0 3.5 - 5.1 mmol/L    Chloride 99 99 - 110 mmol/L    CO2 24 21 - 32 mmol/L    Anion Gap 14 3 - 16    Glucose 79 70 - 99 mg/dL    BUN 16 7 - 20 mg/dL    Creatinine 0.7 0.6 - 1.1 mg/dL    Est, Glom Filt Rate >90 >60    Calcium 9.5 8.3 - 10.6 mg/dL    Total Protein 7.6 6.4 - 8.2 g/dL    Albumin 4.3 3.4 - 5.0 g/dL    Albumin/Globulin Ratio 1.3 1.1 - 2.2    Total Bilirubin 1.4 (H) 0.0 - 1.0 mg/dL    Alkaline Phosphatase 65 40 - 129 U/L    ALT 9 (L) 10 - 40 U/L    AST 15 15 - 37 U/L   Hemoglobin A1C   Result Value Ref Range

## 2024-07-30 NOTE — PATIENT INSTRUCTIONS
Take Hydrochlorothiazide only as needed for swelling.     Monitor your blood pressure at home. If you are mostly running under 110 on top, please let me know and I will cut your lisinopril in half.     Repeat your liver enzymes NONFASTING sometime before school starts.     Make preop appointment as soon as you have surgery scheduled (send me a [employer] message if you cannot get in to see me).     See me in 6 months with fasting blood work.     Keep up the good work!!

## 2024-08-01 DIAGNOSIS — R17 SERUM TOTAL BILIRUBIN ELEVATED: ICD-10-CM

## 2024-08-01 LAB
ALBUMIN SERPL-MCNC: 4.3 G/DL (ref 3.4–5)
ALP SERPL-CCNC: 69 U/L (ref 40–129)
ALT SERPL-CCNC: 10 U/L (ref 10–40)
AST SERPL-CCNC: 14 U/L (ref 15–37)
BILIRUB DIRECT SERPL-MCNC: <0.2 MG/DL (ref 0–0.3)
BILIRUB INDIRECT SERPL-MCNC: ABNORMAL MG/DL (ref 0–1)
BILIRUB SERPL-MCNC: 0.8 MG/DL (ref 0–1)
PROT SERPL-MCNC: 6.8 G/DL (ref 6.4–8.2)

## 2024-08-09 PROBLEM — R73.9 HYPERGLYCEMIA: Status: ACTIVE | Noted: 2024-08-09

## 2024-08-09 PROBLEM — R17 SERUM TOTAL BILIRUBIN ELEVATED: Status: ACTIVE | Noted: 2024-08-09

## 2024-08-09 ASSESSMENT — ENCOUNTER SYMPTOMS
SHORTNESS OF BREATH: 0
CONSTIPATION: 0
CHEST TIGHTNESS: 0
DIARRHEA: 0

## 2024-08-09 NOTE — ASSESSMENT & PLAN NOTE
Blood pressure is actually running low.  Patient hydrochlorothiazide to 25 mg daily as needed for swelling.  Continue lisinopril 10 mg daily.

## 2024-08-09 NOTE — ASSESSMENT & PLAN NOTE
Patient continues with excellent weight loss.  Se has lost 14 pounds since January 2024 weight loss has tapered off.  Increase Mounjaro to 12.5 mg weekly.

## 2024-08-10 DIAGNOSIS — I10 ESSENTIAL HYPERTENSION: ICD-10-CM

## 2024-08-12 RX ORDER — LISINOPRIL 10 MG/1
10 TABLET ORAL DAILY
Qty: 90 TABLET | Refills: 3 | Status: SHIPPED | OUTPATIENT
Start: 2024-08-12

## 2024-08-30 ENCOUNTER — PATIENT MESSAGE (OUTPATIENT)
Dept: INTERNAL MEDICINE CLINIC | Age: 49
End: 2024-08-30

## 2024-08-30 DIAGNOSIS — E66.01 CLASS 3 SEVERE OBESITY WITHOUT SERIOUS COMORBIDITY WITH BODY MASS INDEX (BMI) OF 50.0 TO 59.9 IN ADULT, UNSPECIFIED OBESITY TYPE (HCC): Primary | ICD-10-CM

## 2024-09-03 RX ORDER — TIRZEPATIDE 15 MG/.5ML
15 INJECTION, SOLUTION SUBCUTANEOUS WEEKLY
Qty: 2 ML | Refills: 5 | Status: SHIPPED | OUTPATIENT
Start: 2024-09-03 | End: 2024-09-06 | Stop reason: SDUPTHER

## 2024-09-06 RX ORDER — TIRZEPATIDE 15 MG/.5ML
15 INJECTION, SOLUTION SUBCUTANEOUS WEEKLY
Qty: 2 ML | Refills: 5 | OUTPATIENT
Start: 2024-09-06

## 2024-09-06 RX ORDER — TIRZEPATIDE 15 MG/.5ML
15 INJECTION, SOLUTION SUBCUTANEOUS WEEKLY
Qty: 2 ML | Refills: 5 | Status: SHIPPED | OUTPATIENT
Start: 2024-09-06

## 2024-09-06 NOTE — TELEPHONE ENCOUNTER
Medication was suppose to go to [facility].   Correct pharmacy pending

## 2024-09-19 DIAGNOSIS — Z00.00 WELL ADULT EXAM: ICD-10-CM

## 2024-09-20 RX ORDER — CITALOPRAM HYDROBROMIDE 20 MG/1
20 TABLET ORAL DAILY
Qty: 90 TABLET | Refills: 1 | Status: SHIPPED | OUTPATIENT
Start: 2024-09-20

## 2024-09-22 ENCOUNTER — PATIENT MESSAGE (OUTPATIENT)
Dept: INTERNAL MEDICINE CLINIC | Age: 49
End: 2024-09-22

## 2024-10-21 DIAGNOSIS — I10 ESSENTIAL HYPERTENSION: ICD-10-CM

## 2024-10-22 RX ORDER — HYDROCHLOROTHIAZIDE 25 MG/1
25 TABLET ORAL DAILY PRN
Qty: 90 TABLET | Refills: 1 | Status: SHIPPED | OUTPATIENT
Start: 2024-10-22

## 2024-12-13 ENCOUNTER — PATIENT MESSAGE (OUTPATIENT)
Dept: INTERNAL MEDICINE CLINIC | Age: 49
End: 2024-12-13

## 2025-01-25 SDOH — ECONOMIC STABILITY: INCOME INSECURITY: IN THE LAST 12 MONTHS, WAS THERE A TIME WHEN YOU WERE NOT ABLE TO PAY THE MORTGAGE OR RENT ON TIME?: NO

## 2025-01-25 SDOH — ECONOMIC STABILITY: TRANSPORTATION INSECURITY
IN THE PAST 12 MONTHS, HAS THE LACK OF TRANSPORTATION KEPT YOU FROM MEDICAL APPOINTMENTS OR FROM GETTING MEDICATIONS?: NO

## 2025-01-25 SDOH — ECONOMIC STABILITY: FOOD INSECURITY: WITHIN THE PAST 12 MONTHS, YOU WORRIED THAT YOUR FOOD WOULD RUN OUT BEFORE YOU GOT MONEY TO BUY MORE.: NEVER TRUE

## 2025-01-25 SDOH — ECONOMIC STABILITY: FOOD INSECURITY: WITHIN THE PAST 12 MONTHS, THE FOOD YOU BOUGHT JUST DIDN'T LAST AND YOU DIDN'T HAVE MONEY TO GET MORE.: NEVER TRUE

## 2025-01-25 ASSESSMENT — PATIENT HEALTH QUESTIONNAIRE - PHQ9
2. FEELING DOWN, DEPRESSED OR HOPELESS: NOT AT ALL
SUM OF ALL RESPONSES TO PHQ9 QUESTIONS 1 & 2: 0
2. FEELING DOWN, DEPRESSED OR HOPELESS: NOT AT ALL
SUM OF ALL RESPONSES TO PHQ QUESTIONS 1-9: 0
1. LITTLE INTEREST OR PLEASURE IN DOING THINGS: NOT AT ALL
SUM OF ALL RESPONSES TO PHQ QUESTIONS 1-9: 0
SUM OF ALL RESPONSES TO PHQ9 QUESTIONS 1 & 2: 0
SUM OF ALL RESPONSES TO PHQ QUESTIONS 1-9: 0
1. LITTLE INTEREST OR PLEASURE IN DOING THINGS: NOT AT ALL
SUM OF ALL RESPONSES TO PHQ QUESTIONS 1-9: 0

## 2025-01-28 ENCOUNTER — OFFICE VISIT (OUTPATIENT)
Dept: INTERNAL MEDICINE CLINIC | Age: 50
End: 2025-01-28
Payer: COMMERCIAL

## 2025-01-28 VITALS
OXYGEN SATURATION: 98 % | BODY MASS INDEX: 36.01 KG/M2 | SYSTOLIC BLOOD PRESSURE: 108 MMHG | DIASTOLIC BLOOD PRESSURE: 82 MMHG | HEART RATE: 81 BPM | WEIGHT: 209.8 LBS

## 2025-01-28 DIAGNOSIS — R73.9 HYPERGLYCEMIA: ICD-10-CM

## 2025-01-28 DIAGNOSIS — E55.9 VITAMIN D INSUFFICIENCY: ICD-10-CM

## 2025-01-28 DIAGNOSIS — Z00.00 WELL ADULT EXAM: Primary | ICD-10-CM

## 2025-01-28 DIAGNOSIS — E66.813 CLASS 3 SEVERE OBESITY WITHOUT SERIOUS COMORBIDITY WITH BODY MASS INDEX (BMI) OF 50.0 TO 59.9 IN ADULT, UNSPECIFIED OBESITY TYPE: ICD-10-CM

## 2025-01-28 DIAGNOSIS — E66.01 CLASS 3 SEVERE OBESITY WITHOUT SERIOUS COMORBIDITY WITH BODY MASS INDEX (BMI) OF 50.0 TO 59.9 IN ADULT, UNSPECIFIED OBESITY TYPE: ICD-10-CM

## 2025-01-28 DIAGNOSIS — F41.9 ANXIETY: ICD-10-CM

## 2025-01-28 DIAGNOSIS — I10 ESSENTIAL HYPERTENSION: ICD-10-CM

## 2025-01-28 DIAGNOSIS — E66.812 CLASS 2 OBESITY WITHOUT SERIOUS COMORBIDITY WITH BODY MASS INDEX (BMI) OF 36.0 TO 36.9 IN ADULT, UNSPECIFIED OBESITY TYPE: ICD-10-CM

## 2025-01-28 LAB
25(OH)D3 SERPL-MCNC: 51.4 NG/ML
ALBUMIN SERPL-MCNC: 4.6 G/DL (ref 3.4–5)
ALBUMIN/GLOB SERPL: 2 {RATIO} (ref 1.1–2.2)
ALP SERPL-CCNC: 58 U/L (ref 40–129)
ALT SERPL-CCNC: 11 U/L (ref 10–40)
ANION GAP SERPL CALCULATED.3IONS-SCNC: 9 MMOL/L (ref 3–16)
AST SERPL-CCNC: 18 U/L (ref 15–37)
BASOPHILS # BLD: 0.1 K/UL (ref 0–0.2)
BASOPHILS NFR BLD: 1.3 %
BILIRUB SERPL-MCNC: 2 MG/DL (ref 0–1)
BUN SERPL-MCNC: 15 MG/DL (ref 7–20)
CALCIUM SERPL-MCNC: 9.7 MG/DL (ref 8.3–10.6)
CHLORIDE SERPL-SCNC: 97 MMOL/L (ref 99–110)
CHOLEST SERPL-MCNC: 204 MG/DL (ref 0–199)
CO2 SERPL-SCNC: 30 MMOL/L (ref 21–32)
CREAT SERPL-MCNC: 0.8 MG/DL (ref 0.6–1.1)
DEPRECATED RDW RBC AUTO: 13.9 % (ref 12.4–15.4)
EOSINOPHIL # BLD: 0.1 K/UL (ref 0–0.6)
EOSINOPHIL NFR BLD: 1.1 %
EST. AVERAGE GLUCOSE BLD GHB EST-MCNC: 82.5 MG/DL
GFR SERPLBLD CREATININE-BSD FMLA CKD-EPI: 89 ML/MIN/{1.73_M2}
GLUCOSE SERPL-MCNC: 80 MG/DL (ref 70–99)
HBA1C MFR BLD: 4.5 %
HCT VFR BLD AUTO: 44.8 % (ref 36–48)
HDLC SERPL-MCNC: 59 MG/DL (ref 40–60)
HGB BLD-MCNC: 15.2 G/DL (ref 12–16)
LDLC SERPL CALC-MCNC: 132 MG/DL
LYMPHOCYTES # BLD: 1.5 K/UL (ref 1–5.1)
LYMPHOCYTES NFR BLD: 28.3 %
MCH RBC QN AUTO: 30.8 PG (ref 26–34)
MCHC RBC AUTO-ENTMCNC: 33.9 G/DL (ref 31–36)
MCV RBC AUTO: 90.9 FL (ref 80–100)
MONOCYTES # BLD: 0.4 K/UL (ref 0–1.3)
MONOCYTES NFR BLD: 8.5 %
NEUTROPHILS # BLD: 3.2 K/UL (ref 1.7–7.7)
NEUTROPHILS NFR BLD: 60.8 %
PLATELET # BLD AUTO: 359 K/UL (ref 135–450)
PMV BLD AUTO: 8.1 FL (ref 5–10.5)
POTASSIUM SERPL-SCNC: 3.8 MMOL/L (ref 3.5–5.1)
PROT SERPL-MCNC: 6.9 G/DL (ref 6.4–8.2)
RBC # BLD AUTO: 4.92 M/UL (ref 4–5.2)
SODIUM SERPL-SCNC: 136 MMOL/L (ref 136–145)
TRIGL SERPL-MCNC: 65 MG/DL (ref 0–150)
VLDLC SERPL CALC-MCNC: 13 MG/DL
WBC # BLD AUTO: 5.3 K/UL (ref 4–11)

## 2025-01-28 PROCEDURE — 3079F DIAST BP 80-89 MM HG: CPT | Performed by: INTERNAL MEDICINE

## 2025-01-28 PROCEDURE — 99396 PREV VISIT EST AGE 40-64: CPT | Performed by: INTERNAL MEDICINE

## 2025-01-28 PROCEDURE — 3074F SYST BP LT 130 MM HG: CPT | Performed by: INTERNAL MEDICINE

## 2025-01-28 RX ORDER — TIRZEPATIDE 15 MG/.5ML
15 INJECTION, SOLUTION SUBCUTANEOUS WEEKLY
Qty: 2 ML | Refills: 5 | Status: SHIPPED | OUTPATIENT
Start: 2025-01-28

## 2025-01-28 RX ORDER — CITALOPRAM HYDROBROMIDE 20 MG/1
20 TABLET ORAL DAILY
Qty: 90 TABLET | Refills: 3 | Status: SHIPPED | OUTPATIENT
Start: 2025-01-28

## 2025-01-28 RX ORDER — HYDROCHLOROTHIAZIDE 25 MG/1
25 TABLET ORAL DAILY PRN
Qty: 90 TABLET | Refills: 3 | Status: SHIPPED | OUTPATIENT
Start: 2025-01-28

## 2025-01-28 ASSESSMENT — ENCOUNTER SYMPTOMS
DIARRHEA: 0
CHEST TIGHTNESS: 0
SHORTNESS OF BREATH: 0
CONSTIPATION: 0

## 2025-01-28 NOTE — PROGRESS NOTES
Patient: Cora Tyler is a 49 y.o. female who presents today with the following Chief Complaint(s):  No chief complaint on file.      HPI    Here today for  wellness exam.     Stopped taking lisinopril. Is still taking Hydrochlorothiazide. Will swell if she does not take Hydrochlorothiazide.    Continues to take Zepbound 15 mg weekly.   - weight loss had slowed down and has started doing IF (eating between 3 pm- 8pm).   - focusing on eating mostly lean proteins and plants and limiting carbs and sugars.  - drinking mostly water, 1 soda/week.  - caffeine- 1 energy drink/day  - not exercising.   - still with knee pain/issues though better with weight loss.     Alcohol- \"maybe a couple of drinks per month\". Will swell if she drinks.     Tobacco/vaping- none.     Dentist- routine.     Mammogram- 23    Colonoscopy - . Normal. Repeat in 10 years.     Pap- overdue.     Anxiety- doing really well. Alternating 20 mg and 10 mg. Not ready to fully commit to 10 mg daily.       Due for labs.         Allergies   Allergen Reactions    Buspar [Buspirone] Other (See Comments)     Numbness and tingling       Past Medical History:   Diagnosis Date    Anxiety     Benign paroxysmal positional vertigo of left ear 2018    HTN (hypertension)     Vertigo       Past Surgical History:   Procedure Laterality Date     SECTION  ,    COLONOSCOPY N/A 5/15/2023    COLONOSCOPY DIAGNOSTIC performed by Mejia Tejada MD at [facility] ENDOSCOPY      Social History     Socioeconomic History    Marital status:      Spouse name: Not on file    Number of children: Not on file    Years of education: Not on file    Highest education level: Not on file   Occupational History    Occupation: teacher     Employer: [employer]   Tobacco Use    Smoking status: Never    Smokeless tobacco: Never   Substance and Sexual Activity    Alcohol use: Yes     Comment: rarely    Drug use: No    Sexual activity: Not on file   Other

## 2025-01-28 NOTE — PATIENT INSTRUCTIONS
[facility]  [address] [place] [address] [address],   [place] [address]  Phone: [phone]

## 2025-01-29 NOTE — ASSESSMENT & PLAN NOTE
Doing very well on citalopram, alternating 20 mg and 10 mg every other day.  Patient will let me know if she would like to decrease her dose to 10 mg daily.

## 2025-01-29 NOTE — ASSESSMENT & PLAN NOTE
Patient has been gradually losing weight.  Continue Zepbound 15 mg weekly.  Intermittent fasting has also been helpful.  Patient strongly encouraged to add exercise and weight training.  Patient is lost 110 pounds since 2019.

## 2025-01-29 NOTE — ASSESSMENT & PLAN NOTE
Most recent mammogram was in July 2024.  Underwent colonoscopy in May 2023 that was normal with repeat due in 10 years.  Overdue for Pap smear-recommend patient follow-up GYN.  Up-to-date on vaccines.  Fasting blood work ordered.  Continue working on weight loss.  Patient encouraged to start exercise and add strength training.

## 2025-02-27 PROBLEM — Z00.00 WELL ADULT EXAM: Status: RESOLVED | Noted: 2021-07-14 | Resolved: 2025-02-27

## 2025-05-14 ENCOUNTER — PATIENT MESSAGE (OUTPATIENT)
Dept: INTERNAL MEDICINE CLINIC | Age: 50
End: 2025-05-14

## (undated) DEVICE — VALVE SUCTION AIR H2O SET ORCA POD + DISP

## (undated) DEVICE — ENDOSCOPIC KIT 6X3/16 FT COLON W/ 1.1 OZ 2 GWN W/O BRSH

## (undated) DEVICE — BW-412T DISP COMBO CLEANING BRUSH: Brand: SINGLE USE COMBINATION CLEANING BRUSH

## (undated) DEVICE — AIR/WATER CLEANING ADAPTER FOR OLYMPUS® GI ENDOSCOPE: Brand: BULLDOG®

## (undated) DEVICE — SOLUTION IV IRRIG WATER 500ML POUR BRL ST 2F7113